# Patient Record
Sex: MALE | Race: WHITE | NOT HISPANIC OR LATINO | URBAN - METROPOLITAN AREA
[De-identification: names, ages, dates, MRNs, and addresses within clinical notes are randomized per-mention and may not be internally consistent; named-entity substitution may affect disease eponyms.]

---

## 2018-04-24 ENCOUNTER — EMERGENCY (EMERGENCY)
Facility: HOSPITAL | Age: 38
LOS: 0 days | Discharge: HOME | End: 2018-04-24
Attending: EMERGENCY MEDICINE | Admitting: EMERGENCY MEDICINE

## 2018-04-24 VITALS
DIASTOLIC BLOOD PRESSURE: 70 MMHG | SYSTOLIC BLOOD PRESSURE: 127 MMHG | RESPIRATION RATE: 18 BRPM | TEMPERATURE: 97 F | OXYGEN SATURATION: 96 % | HEART RATE: 75 BPM

## 2018-04-24 DIAGNOSIS — R31.9 HEMATURIA, UNSPECIFIED: ICD-10-CM

## 2018-04-24 DIAGNOSIS — N39.0 URINARY TRACT INFECTION, SITE NOT SPECIFIED: ICD-10-CM

## 2018-04-24 DIAGNOSIS — R10.30 LOWER ABDOMINAL PAIN, UNSPECIFIED: ICD-10-CM

## 2018-04-24 LAB
ALBUMIN SERPL ELPH-MCNC: 4.9 G/DL — SIGNIFICANT CHANGE UP (ref 3.5–5.2)
ALP SERPL-CCNC: 49 U/L — SIGNIFICANT CHANGE UP (ref 30–115)
ALT FLD-CCNC: 46 U/L — HIGH (ref 0–41)
ANION GAP SERPL CALC-SCNC: 15 MMOL/L — HIGH (ref 7–14)
APPEARANCE UR: (no result)
AST SERPL-CCNC: 23 U/L — SIGNIFICANT CHANGE UP (ref 0–41)
BASOPHILS # BLD AUTO: 0.06 K/UL — SIGNIFICANT CHANGE UP (ref 0–0.2)
BASOPHILS NFR BLD AUTO: 1.1 % — HIGH (ref 0–1)
BILIRUB SERPL-MCNC: 0.6 MG/DL — SIGNIFICANT CHANGE UP (ref 0.2–1.2)
BILIRUB UR-MCNC: NEGATIVE — SIGNIFICANT CHANGE UP
BUN SERPL-MCNC: 14 MG/DL — SIGNIFICANT CHANGE UP (ref 10–20)
CALCIUM SERPL-MCNC: 9.2 MG/DL — SIGNIFICANT CHANGE UP (ref 8.5–10.1)
CHLORIDE SERPL-SCNC: 100 MMOL/L — SIGNIFICANT CHANGE UP (ref 98–110)
CO2 SERPL-SCNC: 25 MMOL/L — SIGNIFICANT CHANGE UP (ref 17–32)
COLOR SPEC: (no result)
CREAT SERPL-MCNC: 0.8 MG/DL — SIGNIFICANT CHANGE UP (ref 0.7–1.5)
DIFF PNL FLD: (no result)
EOSINOPHIL # BLD AUTO: 0.21 K/UL — SIGNIFICANT CHANGE UP (ref 0–0.7)
EOSINOPHIL NFR BLD AUTO: 3.8 % — SIGNIFICANT CHANGE UP (ref 0–8)
GLUCOSE SERPL-MCNC: 102 MG/DL — HIGH (ref 70–99)
GLUCOSE UR QL: NEGATIVE MG/DL — SIGNIFICANT CHANGE UP
HCT VFR BLD CALC: 43.7 % — SIGNIFICANT CHANGE UP (ref 42–52)
HGB BLD-MCNC: 15.2 G/DL — SIGNIFICANT CHANGE UP (ref 14–18)
IMM GRANULOCYTES NFR BLD AUTO: 0.2 % — SIGNIFICANT CHANGE UP (ref 0.1–0.3)
KETONES UR-MCNC: NEGATIVE — SIGNIFICANT CHANGE UP
LACTATE SERPL-SCNC: 1.7 MMOL/L — SIGNIFICANT CHANGE UP (ref 0.5–2.2)
LEUKOCYTE ESTERASE UR-ACNC: (no result)
LIDOCAIN IGE QN: 29 U/L — SIGNIFICANT CHANGE UP (ref 7–60)
LYMPHOCYTES # BLD AUTO: 1.67 K/UL — SIGNIFICANT CHANGE UP (ref 1.2–3.4)
LYMPHOCYTES # BLD AUTO: 30.3 % — SIGNIFICANT CHANGE UP (ref 20.5–51.1)
MCHC RBC-ENTMCNC: 30 PG — SIGNIFICANT CHANGE UP (ref 27–31)
MCHC RBC-ENTMCNC: 34.8 G/DL — SIGNIFICANT CHANGE UP (ref 32–37)
MCV RBC AUTO: 86.4 FL — SIGNIFICANT CHANGE UP (ref 80–94)
MONOCYTES # BLD AUTO: 0.67 K/UL — HIGH (ref 0.1–0.6)
MONOCYTES NFR BLD AUTO: 12.1 % — HIGH (ref 1.7–9.3)
NEUTROPHILS # BLD AUTO: 2.9 K/UL — SIGNIFICANT CHANGE UP (ref 1.4–6.5)
NEUTROPHILS NFR BLD AUTO: 52.5 % — SIGNIFICANT CHANGE UP (ref 42.2–75.2)
NITRITE UR-MCNC: NEGATIVE — SIGNIFICANT CHANGE UP
PH UR: 6.5 — SIGNIFICANT CHANGE UP (ref 5–8)
PLATELET # BLD AUTO: 263 K/UL — SIGNIFICANT CHANGE UP (ref 130–400)
POTASSIUM SERPL-MCNC: 4.2 MMOL/L — SIGNIFICANT CHANGE UP (ref 3.5–5)
POTASSIUM SERPL-SCNC: 4.2 MMOL/L — SIGNIFICANT CHANGE UP (ref 3.5–5)
PROT SERPL-MCNC: 7.4 G/DL — SIGNIFICANT CHANGE UP (ref 6–8)
PROT UR-MCNC: >=300 MG/DL
RBC # BLD: 5.06 M/UL — SIGNIFICANT CHANGE UP (ref 4.7–6.1)
RBC # FLD: 12.2 % — SIGNIFICANT CHANGE UP (ref 11.5–14.5)
SODIUM SERPL-SCNC: 140 MMOL/L — SIGNIFICANT CHANGE UP (ref 135–146)
SP GR SPEC: 1.02 — SIGNIFICANT CHANGE UP (ref 1.01–1.03)
UROBILINOGEN FLD QL: 0.2 MG/DL — SIGNIFICANT CHANGE UP (ref 0.2–0.2)
WBC # BLD: 5.52 K/UL — SIGNIFICANT CHANGE UP (ref 4.8–10.8)
WBC # FLD AUTO: 5.52 K/UL — SIGNIFICANT CHANGE UP (ref 4.8–10.8)

## 2018-04-24 RX ORDER — CEFPODOXIME PROXETIL 100 MG
1 TABLET ORAL
Qty: 10 | Refills: 0
Start: 2018-04-24 | End: 2018-04-28

## 2018-04-24 NOTE — ED PROVIDER NOTE - OBJECTIVE STATEMENT
39 yo m p/w painless hematuria since last night still occuring, recalls right flank pain sharp intermittent for 2 seconds last night as well. no n/v/d/f/c.

## 2018-04-24 NOTE — ED PROVIDER NOTE - PROGRESS NOTE DETAILS
Attending note:  I personally evaluated the patient. I reviewed the Resident’s note (as assigned above), and agree with the findings and plan except as documented in my note.   37 y/o M no significant PMH, presents with hematuria that started last night around 9 pm and persisted into this morning. Pt describes multiple episodes of bright red blood in his urine. States that at first this was not associated with pain however during the night he developed R flank pain that later began radiating to the suprapubic region. Denies fever, chills, nausea, vomiting.   Physical exam: Pt non-toxic, well appearing. Pink conjunctiva, anicteric. MMM, no exudates. Neck supple, no crepitus. RRR, no murmur. Lungs CTAB. Abdomen soft, ND, + suprapubic tenderness, no rebound or guarding, no McBurney’s, no peritoneal signs, no CVAT, no hepatosplenomegaly.  uncircumcised male, no testicular tenderness, no swelling, no hernia. No rashes. No calf tenderness or edema. No focal neuro deficits.   Plan- Labs, imaging, reassess.

## 2018-04-24 NOTE — ED PROVIDER NOTE - NS ED ROS FT
Pt denied fvr/chills, HA, visual changes, dizziness, light headedness, presyncope, LOC, CP, MARCELINO, PND, SOB, cough, hemoptysis, abd pain, n/v/d, changes in bowel or bladder habits, LE edema, numbness, weakness, changes in gait.  The pt also denied recent travel outside of the country, recent illnesses, sick contacts, recent airplane trips or periods of immobility/bedbound. +hematuria

## 2018-08-16 ENCOUNTER — EMERGENCY (EMERGENCY)
Facility: HOSPITAL | Age: 38
LOS: 0 days | Discharge: HOME | End: 2018-08-16
Admitting: PHYSICIAN ASSISTANT

## 2018-08-16 VITALS
DIASTOLIC BLOOD PRESSURE: 92 MMHG | SYSTOLIC BLOOD PRESSURE: 132 MMHG | HEART RATE: 65 BPM | RESPIRATION RATE: 18 BRPM | TEMPERATURE: 98 F | OXYGEN SATURATION: 100 %

## 2018-08-16 DIAGNOSIS — Z79.899 OTHER LONG TERM (CURRENT) DRUG THERAPY: ICD-10-CM
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
## 2018-08-16 DIAGNOSIS — S05.02XA INJURY OF CONJUNCTIVA AND CORNEAL ABRASION WITHOUT FOREIGN BODY, LEFT EYE, INITIAL ENCOUNTER: ICD-10-CM
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
## 2018-08-16 DIAGNOSIS — H57.12 OCULAR PAIN, LEFT EYE: ICD-10-CM

## 2018-08-16 RX ORDER — POLYMYXIN B SULF/TRIMETHOPRIM 10000-1/ML
1 DROPS OPHTHALMIC (EYE)
Qty: 1 | Refills: 0
Start: 2018-08-16 | End: 2018-08-22

## 2018-08-16 NOTE — ED PROVIDER NOTE - PHYSICAL EXAMINATION
Physical Exam    Vital Signs: I have reviewed the initial vital signs.  Constitutional: well-nourished, appears stated age, no acute distress  Eyes: PERRL, EOMI. VA 20/20 OD, 20/20 OS (with correction). Left eye: +injected, tearing. Examined after Tetracaine 1 drop. +Floursein stain +corneal abrasion  Neuro: AOx3, Motor 5/5, Sensation: equal and intact

## 2018-08-16 NOTE — ED PROVIDER NOTE - OBJECTIVE STATEMENT
39 yo male  c/o left eye pain. Patient states his 10 month old baby poked him in left eye last night and he has had pain since. +redness, tearing, and light bothering eyes. No blurry vision. No contact use. Tetanus UTD.

## 2018-08-16 NOTE — ED PROVIDER NOTE - NS ED ROS FT
Constitutional: (-) fever  Eyes: (+) left eye pain, redness, tearing  Neurological: (-) altered mental status

## 2019-12-07 ENCOUNTER — TRANSCRIPTION ENCOUNTER (OUTPATIENT)
Age: 39
End: 2019-12-07

## 2020-06-30 ENCOUNTER — INPATIENT (INPATIENT)
Facility: HOSPITAL | Age: 40
LOS: 0 days | Discharge: HOME | End: 2020-07-01
Attending: UROLOGY | Admitting: UROLOGY
Payer: COMMERCIAL

## 2020-06-30 VITALS
DIASTOLIC BLOOD PRESSURE: 91 MMHG | TEMPERATURE: 98 F | HEART RATE: 75 BPM | HEIGHT: 71 IN | SYSTOLIC BLOOD PRESSURE: 139 MMHG | WEIGHT: 220.46 LBS | OXYGEN SATURATION: 98 % | RESPIRATION RATE: 18 BRPM

## 2020-06-30 DIAGNOSIS — Z98.890 OTHER SPECIFIED POSTPROCEDURAL STATES: Chronic | ICD-10-CM

## 2020-06-30 LAB
ALBUMIN SERPL ELPH-MCNC: 4.9 G/DL — SIGNIFICANT CHANGE UP (ref 3.5–5.2)
ALP SERPL-CCNC: 52 U/L — SIGNIFICANT CHANGE UP (ref 30–115)
ALT FLD-CCNC: 39 U/L — SIGNIFICANT CHANGE UP (ref 0–41)
ANION GAP SERPL CALC-SCNC: 13 MMOL/L — SIGNIFICANT CHANGE UP (ref 7–14)
APPEARANCE UR: ABNORMAL
APTT BLD: 39.6 SEC — HIGH (ref 27–39.2)
AST SERPL-CCNC: 23 U/L — SIGNIFICANT CHANGE UP (ref 0–41)
BACTERIA # UR AUTO: NEGATIVE — SIGNIFICANT CHANGE UP
BASOPHILS # BLD AUTO: 0.07 K/UL — SIGNIFICANT CHANGE UP (ref 0–0.2)
BASOPHILS NFR BLD AUTO: 1.2 % — HIGH (ref 0–1)
BILIRUB SERPL-MCNC: 0.3 MG/DL — SIGNIFICANT CHANGE UP (ref 0.2–1.2)
BILIRUB UR-MCNC: NEGATIVE — SIGNIFICANT CHANGE UP
BLD GP AB SCN SERPL QL: SIGNIFICANT CHANGE UP
BUN SERPL-MCNC: 18 MG/DL — SIGNIFICANT CHANGE UP (ref 10–20)
CALCIUM SERPL-MCNC: 9.2 MG/DL — SIGNIFICANT CHANGE UP (ref 8.5–10.1)
CHLORIDE SERPL-SCNC: 103 MMOL/L — SIGNIFICANT CHANGE UP (ref 98–110)
CO2 SERPL-SCNC: 23 MMOL/L — SIGNIFICANT CHANGE UP (ref 17–32)
COLOR SPEC: SIGNIFICANT CHANGE UP
CREAT SERPL-MCNC: 1 MG/DL — SIGNIFICANT CHANGE UP (ref 0.7–1.5)
DIFF PNL FLD: ABNORMAL
EOSINOPHIL # BLD AUTO: 0.16 K/UL — SIGNIFICANT CHANGE UP (ref 0–0.7)
EOSINOPHIL NFR BLD AUTO: 2.8 % — SIGNIFICANT CHANGE UP (ref 0–8)
EPI CELLS # UR: 0 /HPF — SIGNIFICANT CHANGE UP (ref 0–5)
GLUCOSE SERPL-MCNC: 111 MG/DL — HIGH (ref 70–99)
GLUCOSE UR QL: NEGATIVE — SIGNIFICANT CHANGE UP
HCT VFR BLD CALC: 44.5 % — SIGNIFICANT CHANGE UP (ref 42–52)
HGB BLD-MCNC: 15.7 G/DL — SIGNIFICANT CHANGE UP (ref 14–18)
HYALINE CASTS # UR AUTO: 1 /LPF — SIGNIFICANT CHANGE UP (ref 0–7)
IMM GRANULOCYTES NFR BLD AUTO: 0.2 % — SIGNIFICANT CHANGE UP (ref 0.1–0.3)
INR BLD: 0.97 RATIO — SIGNIFICANT CHANGE UP (ref 0.65–1.3)
KETONES UR-MCNC: NEGATIVE — SIGNIFICANT CHANGE UP
LEUKOCYTE ESTERASE UR-ACNC: NEGATIVE — SIGNIFICANT CHANGE UP
LIDOCAIN IGE QN: 26 U/L — SIGNIFICANT CHANGE UP (ref 7–60)
LYMPHOCYTES # BLD AUTO: 1.49 K/UL — SIGNIFICANT CHANGE UP (ref 1.2–3.4)
LYMPHOCYTES # BLD AUTO: 26.4 % — SIGNIFICANT CHANGE UP (ref 20.5–51.1)
MCHC RBC-ENTMCNC: 32 PG — HIGH (ref 27–31)
MCHC RBC-ENTMCNC: 35.3 G/DL — SIGNIFICANT CHANGE UP (ref 32–37)
MCV RBC AUTO: 90.6 FL — SIGNIFICANT CHANGE UP (ref 80–94)
MONOCYTES # BLD AUTO: 0.69 K/UL — HIGH (ref 0.1–0.6)
MONOCYTES NFR BLD AUTO: 12.2 % — HIGH (ref 1.7–9.3)
NEUTROPHILS # BLD AUTO: 3.22 K/UL — SIGNIFICANT CHANGE UP (ref 1.4–6.5)
NEUTROPHILS NFR BLD AUTO: 57.2 % — SIGNIFICANT CHANGE UP (ref 42.2–75.2)
NITRITE UR-MCNC: NEGATIVE — SIGNIFICANT CHANGE UP
NRBC # BLD: 0 /100 WBCS — SIGNIFICANT CHANGE UP (ref 0–0)
PH UR: 5.5 — SIGNIFICANT CHANGE UP (ref 5–8)
PLATELET # BLD AUTO: 273 K/UL — SIGNIFICANT CHANGE UP (ref 130–400)
POTASSIUM SERPL-MCNC: 4.3 MMOL/L — SIGNIFICANT CHANGE UP (ref 3.5–5)
POTASSIUM SERPL-SCNC: 4.3 MMOL/L — SIGNIFICANT CHANGE UP (ref 3.5–5)
PROT SERPL-MCNC: 7.7 G/DL — SIGNIFICANT CHANGE UP (ref 6–8)
PROT UR-MCNC: SIGNIFICANT CHANGE UP
PROTHROM AB SERPL-ACNC: 11.1 SEC — SIGNIFICANT CHANGE UP (ref 9.95–12.87)
RBC # BLD: 4.91 M/UL — SIGNIFICANT CHANGE UP (ref 4.7–6.1)
RBC # FLD: 12.3 % — SIGNIFICANT CHANGE UP (ref 11.5–14.5)
RBC CASTS # UR COMP ASSIST: 40 /HPF — HIGH (ref 0–4)
SODIUM SERPL-SCNC: 139 MMOL/L — SIGNIFICANT CHANGE UP (ref 135–146)
SP GR SPEC: 1.02 — SIGNIFICANT CHANGE UP (ref 1.01–1.02)
UROBILINOGEN FLD QL: SIGNIFICANT CHANGE UP
WBC # BLD: 5.64 K/UL — SIGNIFICANT CHANGE UP (ref 4.8–10.8)
WBC # FLD AUTO: 5.64 K/UL — SIGNIFICANT CHANGE UP (ref 4.8–10.8)
WBC UR QL: 1 /HPF — SIGNIFICANT CHANGE UP (ref 0–5)

## 2020-06-30 PROCEDURE — 93010 ELECTROCARDIOGRAM REPORT: CPT

## 2020-06-30 PROCEDURE — 99285 EMERGENCY DEPT VISIT HI MDM: CPT

## 2020-06-30 PROCEDURE — 74177 CT ABD & PELVIS W/CONTRAST: CPT | Mod: 26

## 2020-06-30 PROCEDURE — 99222 1ST HOSP IP/OBS MODERATE 55: CPT

## 2020-06-30 RX ORDER — ACETAMINOPHEN 500 MG
650 TABLET ORAL EVERY 6 HOURS
Refills: 0 | Status: DISCONTINUED | OUTPATIENT
Start: 2020-06-30 | End: 2020-07-01

## 2020-06-30 RX ORDER — SODIUM CHLORIDE 9 MG/ML
1000 INJECTION, SOLUTION INTRAVENOUS ONCE
Refills: 0 | Status: COMPLETED | OUTPATIENT
Start: 2020-06-30 | End: 2020-06-30

## 2020-06-30 RX ORDER — KETOROLAC TROMETHAMINE 30 MG/ML
15 SYRINGE (ML) INJECTION EVERY 6 HOURS
Refills: 0 | Status: DISCONTINUED | OUTPATIENT
Start: 2020-06-30 | End: 2020-07-01

## 2020-06-30 RX ORDER — KETOROLAC TROMETHAMINE 30 MG/ML
15 SYRINGE (ML) INJECTION ONCE
Refills: 0 | Status: DISCONTINUED | OUTPATIENT
Start: 2020-06-30 | End: 2020-06-30

## 2020-06-30 RX ORDER — SODIUM CHLORIDE 9 MG/ML
1000 INJECTION INTRAMUSCULAR; INTRAVENOUS; SUBCUTANEOUS
Refills: 0 | Status: DISCONTINUED | OUTPATIENT
Start: 2020-06-30 | End: 2020-07-01

## 2020-06-30 RX ADMIN — SODIUM CHLORIDE 125 MILLILITER(S): 9 INJECTION INTRAMUSCULAR; INTRAVENOUS; SUBCUTANEOUS at 21:52

## 2020-06-30 RX ADMIN — Medication 15 MILLIGRAM(S): at 15:31

## 2020-06-30 RX ADMIN — SODIUM CHLORIDE 1000 MILLILITER(S): 9 INJECTION, SOLUTION INTRAVENOUS at 10:54

## 2020-06-30 RX ADMIN — Medication 15 MILLIGRAM(S): at 10:53

## 2020-06-30 RX ADMIN — SODIUM CHLORIDE 125 MILLILITER(S): 9 INJECTION INTRAMUSCULAR; INTRAVENOUS; SUBCUTANEOUS at 15:31

## 2020-06-30 RX ADMIN — Medication 650 MILLIGRAM(S): at 21:50

## 2020-06-30 NOTE — H&P ADULT - NSHPPHYSICALEXAM_GEN_ALL_CORE
Vital Signs Last 24 Hrs  T(C): 36.7 (30 Jun 2020 10:05), Max: 36.7 (30 Jun 2020 10:05)  T(F): 98.1 (30 Jun 2020 10:05), Max: 98.1 (30 Jun 2020 10:05)  HR: 75 (30 Jun 2020 10:05) (75 - 75)  BP: 139/91 (30 Jun 2020 10:05) (139/91 - 139/91)  RR: 18 (30 Jun 2020 10:05) (18 - 18)  SpO2: 98% (30 Jun 2020 10:05) (98% - 98%)    PHYSICAL EXAM:  Constitutional: NAD, well-developed  HEENT: EOMI  Neck: no pain  Respiratory: No accessory respiratory muscle use  Abd: Soft, nontender, nondistended  : +L CVAT, mild suprapubic tenderness  Extremities: no edema  Neurological: A/O x 3  Psychiatric: Normal mood, normal affect  Skin: No rashes Vital Signs Last 24 Hrs  T(C): 36.7 (30 Jun 2020 10:05), Max: 36.7 (30 Jun 2020 10:05)  T(F): 98.1 (30 Jun 2020 10:05), Max: 98.1 (30 Jun 2020 10:05)  HR: 75 (30 Jun 2020 10:05) (75 - 75)  BP: 139/91 (30 Jun 2020 10:05) (139/91 - 139/91)  RR: 18 (30 Jun 2020 10:05) (18 - 18)  SpO2: 98% (30 Jun 2020 10:05) (98% - 98%)    PHYSICAL EXAM:  Constitutional: NAD, well-developed  HEENT: EOMI  Neck: no pain  Respiratory: No accessory respiratory muscle use  Abd: Soft, nontender, nondistended  : 1+L CVAT, mild suprapubic tenderness  Extremities: no edema  Neurological: A/O x 3  Psychiatric: Normal mood, normal affect  Skin: No rashes

## 2020-06-30 NOTE — ED PROVIDER NOTE - PROGRESS NOTE DETAILS
Attending Note: I personally evaluated the patient. I reviewed the Resident’s note (as assigned above), and agree with the findings and plan except as documented in my note.   39 y/o M with PMH of kidney stones, presents to ED c/o L flank pain associated with nausea since last night. Pt states he woke up from the pain last night and describes the pain to be intermittent radiating to the abdomen. No CP, SOB or testicular pain. No blood in urine, urinary frequency, urgency or incontinence.   PE: Pt in NAD, AAOX3. Head NCAT. CN II-XII intact. Lungs CTABL. CV S1S2 regular. Abdomen soft NTND, (+) BS. Extremities (-) edema. Motor 5/5 x4, sensation intact.  Plan: Labs, UA, CT, reevaluate. Attending Note: I personally evaluated the patient. I reviewed the Resident’s note (as assigned above), and agree with the findings and plan except as documented in my note.   39 y/o M with PMH of kidney stones, presents to ED c/o L flank pain associated with nausea since last night. Pt states he woke up from the pain last night and describes the pain to be intermittent radiating to the abdomen. No CP, SOB or testicular pain. No blood in urine, urinary frequency, urgency or incontinence.   PE: Pt in NAD, AAOX3. Head NCAT. CN II-XII intact. Lungs CTABL. CV S1S2 regular. Abdomen soft NTND, (+) BS. Back (-) CVA tenderness. Extremities (-) edema. Motor 5/5 x4, sensation intact.  Plan: Labs, UA, CT, reevaluate. CT reviewed, urology consulted. Will see pt.

## 2020-06-30 NOTE — ED PROVIDER NOTE - OBJECTIVE STATEMENT
The patient is a 40 year old male with a history of kidney stone presenting for left flank pain that began at 2AM last night. Constant sharp, pain that increases in intensity, radiating to left abdomen. Associated with nausea and urinary hesitancy. No fevers, vomiting, hematuria, testicular pain. Was told he had a large kidney stone a few months ago and saw a urologist but did not follow up with the procedure due to covid.

## 2020-06-30 NOTE — H&P ADULT - NSHPLABSRESULTS_GEN_ALL_CORE
LABS:                     15.7   5.64  )-----------( 273      ( 2020 10:41 )             44.5     06-30    139  |  103  |  18  ----------------------------<  111<H>  4.3   |  23  |  1.0    Ca    9.2      2020 10:41  TPro  7.7  /  Alb  4.9  /  TBili  0.3  /  DBili  x   /  AST  23  /  ALT  39  /  AlkPhos  52  06-30      Urinalysis Basic - ( 2020 10:41 )  Color: Light Yellow / Appearance: Slightly Turbid / S.020 / pH: x  Gluc: x / Ketone: Negative  / Bili: Negative / Urobili: <2 mg/dL   Blood: x / Protein: Trace / Nitrite: Negative   Leuk Esterase: Negative / RBC: 40 /HPF / WBC 1 /HPF   Sq Epi: x / Non Sq Epi: 0 /HPF / Bacteria: Negative LABS:                     15.7   5.64  )-----------( 273      ( 2020 10:41 )             44.5         139  |  103  |  18  ----------------------------<  111<H>  4.3   |  23  |  1.0    Ca    9.2      2020 10:41  TPro  7.7  /  Alb  4.9  /  TBili  0.3  /  DBili  x   /  AST  23  /  ALT  39  /  AlkPhos  52        Urinalysis Basic - ( 2020 10:41 )  Color: Light Yellow / Appearance: Slightly Turbid / S.020 / pH: x  Gluc: x / Ketone: Negative  / Bili: Negative / Urobili: <2 mg/dL   Blood: x / Protein: Trace / Nitrite: Negative   Leuk Esterase: Negative / RBC: 40 /HPF / WBC 1 /HPF   Sq Epi: x / Non Sq Epi: 0 /HPF / Bacteria: Negative    EXAM:  CT ABDOMEN AND PELVIS IC          PROCEDURE DATE:  2020    INTERPRETATION:  CLINICAL STATEMENT: Left-sided flank pain, history of kidney stones.    TECHNIQUE: Contiguous axial CT images were obtained from the lower chest tothe pubic symphysis following administration of 100cc Optiray 320 intravenous contrast.  Oral contrast was not administered.  Reformatted images in the coronal and sagittal planes were acquired.  COMPARISON CT: 2018.  OTHER STUDIES USED FOR CORRELATION: None.     FINDINGS:  LOWER CHEST: Unremarkable.  HEPATOBILIARY: Hepatic steatosis.  SPLEEN: Unremarkable.  PANCREAS: Unremarkable.  ADRENAL GLANDS: Unremarkable.  KIDNEYS: Mildly delayed left nephrogram. 2.0 x 0.9 x 1.5 cm left ureteropelvic junction calculus causing mild hydronephrosis (400 Hounsfield units). Multiple left-sided nonobstructing renal calculi measuring up to 3 mm. The right kidney is unremarkable.  ABDOMINOPELVIC NODES: Unremarkable.  PELVIC ORGANS: Unremarkable.  PERITONEUM/MESENTERY/BOWEL: Unremarkable. Normal appendix.  BONES/SOFT TISSUES: Unremarkable.    IMPRESSION:   2.0 x 0.9 x 1.5 cm left ureteropelvic junction calculus causing mild hydronephrosis (400 Hounsfield units).   Multiple left-sided nonobstructing renal calculi measuring up to 3 mm.     NAYE CONRAD M.D., ATTENDING RADIOLOGIST  This document has been electronically signed. 2020  1:49PM LABS:                     15.7   5.64  )-----------( 273      ( 2020 10:41 )             44.5         139  |  103  |  18  ----------------------------<  111<H>  4.3   |  23  |  1.0    Ca    9.2      2020 10:41  TPro  7.7  /  Alb  4.9  /  TBili  0.3  /  DBili  x   /  AST  23  /  ALT  39  /  AlkPhos  52        Urinalysis Basic - ( 2020 10:41 )  Color: Light Yellow / Appearance: Slightly Turbid / S.020 / pH: x  Gluc: x / Ketone: Negative  / Bili: Negative / Urobili: <2 mg/dL   Blood: x / Protein: Trace / Nitrite: Negative   Leuk Esterase: Negative / RBC: 40 /HPF / WBC 1 /HPF   Sq Epi: x / Non Sq Epi: 0 /HPF / Bacteria: Negative    EXAM:  CT ABDOMEN AND PELVIS IC          PROCEDURE DATE:  2020    INTERPRETATION:  CLINICAL STATEMENT: Left-sided flank pain, history of kidney stones.    TECHNIQUE: Contiguous axial CT images were obtained from the lower chest tothe pubic symphysis following administration of 100cc Optiray 320 intravenous contrast.  Oral contrast was not administered.  Reformatted images in the coronal and sagittal planes were acquired.  COMPARISON CT: 2018.  OTHER STUDIES USED FOR CORRELATION: None.     FINDINGS:  LOWER CHEST: Unremarkable.  HEPATOBILIARY: Hepatic steatosis.  SPLEEN: Unremarkable.  PANCREAS: Unremarkable.  ADRENAL GLANDS: Unremarkable.  KIDNEYS: Mildly delayed left nephrogram. 2.0 x 0.9 x 1.5 cm left ureteropelvic junction calculus causing mild hydronephrosis (400 Hounsfield units). Multiple left-sided nonobstructing renal calculi measuring up to 3 mm. The right kidney is unremarkable.  ABDOMINOPELVIC NODES: Unremarkable.  PELVIC ORGANS: Unremarkable.  PERITONEUM/MESENTERY/BOWEL: Unremarkable. Normal appendix.  BONES/SOFT TISSUES: Unremarkable.    IMPRESSION:   2.0 x 0.9 x 1.5 cm left ureteropelvic junction calculus causing mild hydronephrosis (400 Hounsfield units).     Multiple left-sided nonobstructing renal calculi measuring up to 3 mm.     NAYE CONRAD M.D., ATTENDING RADIOLOGIST  This document has been electronically signed. 2020  1:49PM

## 2020-06-30 NOTE — H&P ADULT - HISTORY OF PRESENT ILLNESS
Pt is a 39y/o M with pmh known 7mm kidney stone diagnosed last year presenting with 1 day h/o left sided flank pain, nausea, and urinary frequency. Pt states that he woke up at 2AM with severe 8/10 left sided flank pain radiating to abdomen. Denies fevers, chills, vomiting, dysuria at this time. Denies hematuria at home but while here in the ED he states he saw a pink tinge to his urine. States that he has been voiding more than usual, not voiding a large amount per void. CT A/P showing a 2.0 x 0.9 x 1.5cm Left UPJ stone causing mild hydronephrosis (400HU).

## 2020-06-30 NOTE — H&P ADULT - ASSESSMENT
41y/o M with mild Left hydronephrosis 2/2 a 2 x 0.9 x 1.5cm LEFT UPJ     - Case d/w Dr. Vann - possibly can do Cystoscopy, Left ureteroscopy, placement of LEFT nephroureteral stent   - Keep NPO for possible procedure today  - Consult IR for possible placement from above if unable to do procedure today   - F/u STAT COVID swab 41y/o M with mild Left hydronephrosis 2/2 a 2 x 0.9 x 1.5cm LEFT UPJ     - Case d/w Dr. Vann - possibly can do Cystoscopy, Left ureteroscopy, placement of LEFT nephroureteral stent   - Keep NPO for possible procedure today  - Case d/w IR resident, if cannot place stent from below today, IR may be able to place from above tomorrow AM.   - F/u STAT COVID swab 39y/o M with mild Left hydronephrosis 2/2 a 2 x 0.9 x 1.5cm LEFT UPJ stone     - Case d/w Dr. Vann - possibly can do Cystoscopy, Left ureteroscopy, placement of LEFT nephroureteral stent   - Keep NPO for possible procedure today  - Case d/w IR resident, if cannot place stent from below today, IR may be able to place from above tomorrow AM.   - F/u STAT COVID swab 39y/o M with mild Left hydronephrosis 2/2 a 2 x 0.9 x 1.5cm LEFT UPJ stone     - Case d/w Dr. Vann - possibly can do Cystoscopy, Left ureteroscopy, placement of LEFT nephroureteral stent   - Keep NPO for possible procedure today  - Case d/w IR resident, if cannot place stent from below today, IR may be able to place from above tomorrow AM.   - F/u STAT COVID swab    UPDATED PLAN:  - Pt seen and examined at bedside with Dr. Vann  - Pt to get access to LEFT kidney with IR tomorrow morning. Pt added to OR Add on sched for tomorrow evening 8PM for LEFT PCNL with Dr. Vann.  - Pt can eat regular diet but will be NPO after midnight. 39y/o M with mild Left hydronephrosis 2/2 a 2 x 0.9 x 1.5cm LEFT UPJ stone     - Case d/w Dr. Vann - possibly can do Cystoscopy, Left ureteroscopy, placement of LEFT nephroureteral stent but one and done probably in rehana 80-85% range  - Keep NPO for possible procedure today  - Case d/w IR resident, if cannot place stent from below today, IR may be able to place from above tomorrow AM.   - F/u STAT COVID swab    UPDATED PLAN:  - Pt seen and examined at bedside with Dr. Vann  - Pt to get access to LEFT kidney with IR tomorrow morning. Pt added to OR Add on sched for tomorrow evening 8PM for LEFT PCNL with Dr. Vann.  - Pt can eat regular diet but will be NPO after midnight.     Tomorrow review the stone is probably about 22 mm but to make it more complicated the outer half at least by diameter probably three quarters by volume appears to be uric acid with the central dense core. This would make shockwave lithotripsy much more difficult. We discussed the use of ureteroscopy which given the size may include a double J followed by a ureteral reactor sheath and laser lithotripsy which would be a much more prolonged procedure the possibly less acutely painful possibly more bothersome over time with the lower success rate for stone free. We discussed going to a percutaneous approach which in my experience would probably be 95% one and done with the increased risks of every to going through your kidney and probably limited physical exertion for about a month and it is discussing the risk benefits and alternatives elected for the PCNL. I made arrangements including talking to the interventional radiologist to get access in the morning putting him on your schedule (I tried to do him tonight but interventional radiology had already left and did not have a team for not urging cases. He is therefore put on the schedule for tomorrow there will see if they can get me a room early morning is so intervention will put in the access first thing and he would be my first case hopefully before office hours. If not they will put in the access in the afternoon and I will do him in the mid-evening after office hours.

## 2020-06-30 NOTE — H&P ADULT - NSHPREVIEWOFSYSTEMS_GEN_ALL_CORE
CONSTITUTIONAL:  No fevers or chills  HEENT: No visual changes  ENDO: No sweating  NECK: No pain or stiffness  MUSCULOSKELETAL: No back pain, no joint pain  RESPIRATORY: No shortness of breath  CARDIOVASCULAR: No chest pain  GASTROINTESTINAL: No abdominal or epigastric pain. No nausea, vomiting,  No diarrhea or constipation.   NEUROLOGICAL: No mental status changes  PSYCH: No depression, no mood changes  SKIN: No itching

## 2020-06-30 NOTE — H&P ADULT - ATTENDING COMMENTS
the patient was given toradol - not a candidate for a PCN  will undergo a left stent with possible ureteroscopy laser lithotripsy   all questions answered I personally saw and examined the patient, reviewed the chart and available data. I discussed the situation with the patient and [facility] as well as IR. I also reviewed and/or amended the note as necessary.      I spent close to an hour with attempted booking / arrangements with IR, showing pt the films and discussing options    patient seen on the day in question. Note not reviewed and cosigned until now due to scheduling issues    addendum 07/01/2020 pt had gotten toradol. case discussed with IR unless urgent prefer to wait until it wears off as decreased bleeding with higher success rate. Pt agreed to stent today by Dr. Burrows   f/u Thursday to arrange for Tuesday PCNL with IR access same day      the patient was given toradol - not a candidate for a PCN will undergo a left stent with possible ureteroscopy laser lithotripsy   all questions answered

## 2020-06-30 NOTE — ED PROVIDER NOTE - NS ED ROS FT
Eyes:  No visual changes, eye pain or discharge.  ENMT:  No hearing changes, pain, discharge or infections. No neck pain or stiffness.  Cardiac:  No chest pain, SOB or edema. No chest pain with exertion.  Respiratory:  No cough or respiratory distress. No hemoptysis. No history of asthma or RAD.  GI:  + nausea, no vomiting, diarrhea +mild abdominal pain.   :  No dysuria, frequency or burning. +urinary hestinancy. no hematuria.   MS:  +left flank/lower left back pain.   Neuro:  No headache or weakness.  No LOC.  Skin:  No skin rash.   Endocrine: No history of thyroid disease or diabetes.

## 2020-06-30 NOTE — ED PROVIDER NOTE - PHYSICAL EXAMINATION
CONSTITUTIONAL: Well-developed; well-nourished; appears in mild  distress from pain.   SKIN: warm, dry  HEAD: Normocephalic; atraumatic.  EYES:  EOMI, normal sclera and conjunctiva   ENT: No nasal discharge; airway clear.  NECK: Supple; non tender.  CARD: S1, S2 normal; no murmurs, gallops, or rubs.   RESP: No wheezes, rales or rhonchi.  ABD: mild ttp to left lower quadrant.   BACK: CVA ttp left side. no right CVA ttp.   EXT: Normal ROM.  No clubbing, cyanosis or edema.   LYMPH: No acute cervical adenopathy.  NEURO: Alert, oriented, grossly unremarkable  PSYCH: Cooperative, appropriate.

## 2020-07-01 ENCOUNTER — TRANSCRIPTION ENCOUNTER (OUTPATIENT)
Age: 40
End: 2020-07-01

## 2020-07-01 VITALS
DIASTOLIC BLOOD PRESSURE: 63 MMHG | TEMPERATURE: 97 F | HEART RATE: 80 BPM | SYSTOLIC BLOOD PRESSURE: 127 MMHG | RESPIRATION RATE: 18 BRPM

## 2020-07-01 LAB
ANION GAP SERPL CALC-SCNC: 9 MMOL/L — SIGNIFICANT CHANGE UP (ref 7–14)
BASOPHILS # BLD AUTO: 0.07 K/UL — SIGNIFICANT CHANGE UP (ref 0–0.2)
BASOPHILS NFR BLD AUTO: 1.1 % — HIGH (ref 0–1)
BUN SERPL-MCNC: 14 MG/DL — SIGNIFICANT CHANGE UP (ref 10–20)
CALCIUM SERPL-MCNC: 9.4 MG/DL — SIGNIFICANT CHANGE UP (ref 8.5–10.1)
CHLORIDE SERPL-SCNC: 106 MMOL/L — SIGNIFICANT CHANGE UP (ref 98–110)
CO2 SERPL-SCNC: 27 MMOL/L — SIGNIFICANT CHANGE UP (ref 17–32)
CREAT SERPL-MCNC: 0.9 MG/DL — SIGNIFICANT CHANGE UP (ref 0.7–1.5)
CULTURE RESULTS: NO GROWTH — SIGNIFICANT CHANGE UP
EOSINOPHIL # BLD AUTO: 0.23 K/UL — SIGNIFICANT CHANGE UP (ref 0–0.7)
EOSINOPHIL NFR BLD AUTO: 3.5 % — SIGNIFICANT CHANGE UP (ref 0–8)
GLUCOSE SERPL-MCNC: 103 MG/DL — HIGH (ref 70–99)
HCT VFR BLD CALC: 43.8 % — SIGNIFICANT CHANGE UP (ref 42–52)
HGB BLD-MCNC: 15.1 G/DL — SIGNIFICANT CHANGE UP (ref 14–18)
IMM GRANULOCYTES NFR BLD AUTO: 0.2 % — SIGNIFICANT CHANGE UP (ref 0.1–0.3)
LYMPHOCYTES # BLD AUTO: 1.47 K/UL — SIGNIFICANT CHANGE UP (ref 1.2–3.4)
LYMPHOCYTES # BLD AUTO: 22.2 % — SIGNIFICANT CHANGE UP (ref 20.5–51.1)
MCHC RBC-ENTMCNC: 31.7 PG — HIGH (ref 27–31)
MCHC RBC-ENTMCNC: 34.5 G/DL — SIGNIFICANT CHANGE UP (ref 32–37)
MCV RBC AUTO: 91.8 FL — SIGNIFICANT CHANGE UP (ref 80–94)
MONOCYTES # BLD AUTO: 0.79 K/UL — HIGH (ref 0.1–0.6)
MONOCYTES NFR BLD AUTO: 11.9 % — HIGH (ref 1.7–9.3)
NEUTROPHILS # BLD AUTO: 4.05 K/UL — SIGNIFICANT CHANGE UP (ref 1.4–6.5)
NEUTROPHILS NFR BLD AUTO: 61.1 % — SIGNIFICANT CHANGE UP (ref 42.2–75.2)
NRBC # BLD: 0 /100 WBCS — SIGNIFICANT CHANGE UP (ref 0–0)
PLATELET # BLD AUTO: 263 K/UL — SIGNIFICANT CHANGE UP (ref 130–400)
POTASSIUM SERPL-MCNC: 4.7 MMOL/L — SIGNIFICANT CHANGE UP (ref 3.5–5)
POTASSIUM SERPL-SCNC: 4.7 MMOL/L — SIGNIFICANT CHANGE UP (ref 3.5–5)
RBC # BLD: 4.77 M/UL — SIGNIFICANT CHANGE UP (ref 4.7–6.1)
RBC # FLD: 12.4 % — SIGNIFICANT CHANGE UP (ref 11.5–14.5)
SARS-COV-2 RNA SPEC QL NAA+PROBE: SIGNIFICANT CHANGE UP
SODIUM SERPL-SCNC: 142 MMOL/L — SIGNIFICANT CHANGE UP (ref 135–146)
SPECIMEN SOURCE: SIGNIFICANT CHANGE UP
WBC # BLD: 6.62 K/UL — SIGNIFICANT CHANGE UP (ref 4.8–10.8)
WBC # FLD AUTO: 6.62 K/UL — SIGNIFICANT CHANGE UP (ref 4.8–10.8)

## 2020-07-01 PROCEDURE — 99231 SBSQ HOSP IP/OBS SF/LOW 25: CPT

## 2020-07-01 RX ORDER — ONDANSETRON 8 MG/1
4 TABLET, FILM COATED ORAL ONCE
Refills: 0 | Status: DISCONTINUED | OUTPATIENT
Start: 2020-07-01 | End: 2020-07-01

## 2020-07-01 RX ORDER — SODIUM CHLORIDE 9 MG/ML
1000 INJECTION, SOLUTION INTRAVENOUS
Refills: 0 | Status: DISCONTINUED | OUTPATIENT
Start: 2020-07-01 | End: 2020-07-01

## 2020-07-01 RX ORDER — TRAMADOL HYDROCHLORIDE 50 MG/1
1 TABLET ORAL
Qty: 24 | Refills: 0
Start: 2020-07-01 | End: 2020-07-06

## 2020-07-01 RX ORDER — HYDROMORPHONE HYDROCHLORIDE 2 MG/ML
0.5 INJECTION INTRAMUSCULAR; INTRAVENOUS; SUBCUTANEOUS
Refills: 0 | Status: DISCONTINUED | OUTPATIENT
Start: 2020-07-01 | End: 2020-07-01

## 2020-07-01 RX ORDER — SODIUM CHLORIDE 9 MG/ML
1000 INJECTION INTRAMUSCULAR; INTRAVENOUS; SUBCUTANEOUS
Refills: 0 | Status: DISCONTINUED | OUTPATIENT
Start: 2020-07-01 | End: 2020-07-01

## 2020-07-01 RX ORDER — ACETAMINOPHEN 500 MG
650 TABLET ORAL EVERY 6 HOURS
Refills: 0 | Status: DISCONTINUED | OUTPATIENT
Start: 2020-07-01 | End: 2020-07-01

## 2020-07-01 RX ORDER — MORPHINE SULFATE 50 MG/1
2 CAPSULE, EXTENDED RELEASE ORAL
Refills: 0 | Status: DISCONTINUED | OUTPATIENT
Start: 2020-07-01 | End: 2020-07-01

## 2020-07-01 RX ADMIN — SODIUM CHLORIDE 125 MILLILITER(S): 9 INJECTION INTRAMUSCULAR; INTRAVENOUS; SUBCUTANEOUS at 04:50

## 2020-07-01 RX ADMIN — Medication 650 MILLIGRAM(S): at 15:08

## 2020-07-01 RX ADMIN — SODIUM CHLORIDE 75 MILLILITER(S): 9 INJECTION, SOLUTION INTRAVENOUS at 12:17

## 2020-07-01 NOTE — DISCHARGE NOTE NURSING/CASE MANAGEMENT/SOCIAL WORK - PATIENT PORTAL LINK FT
You can access the FollowMyHealth Patient Portal offered by [facility] by registering at the following website: [email or web address]. By joining [facility]’s FollowMyHealth portal, you will also be able to view your health information using other applications (apps) compatible with our system.

## 2020-07-01 NOTE — DISCHARGE NOTE PROVIDER - NSDCMRMEDTOKEN_GEN_ALL_CORE_FT
cefpodoxime 100 mg oral tablet: 1 tab(s) orally 2 times a day   Polytrim 10,000 units-1 mg/mL ophthalmic solution: 1 drop(s) in each affected eye 4 times a day traMADol 50 mg oral tablet: 1 tab(s) orally every 6 hours MDD:4

## 2020-07-01 NOTE — PROGRESS NOTE ADULT - SUBJECTIVE AND OBJECTIVE BOX
Progress Note    Subjective  39 y/o Male left nephrolithiasis. Pt admitted for pain control.  Pt doing better this am pain 3/10 well controlled with meds. Pt received Toradol   yesterday.    [x] a 10 point review of systems was negative except where noted    [  ]  Due to altered mental status/intubation, subjective information was not able t be obtained from the patient.  History was obtained, to the extent possible, from review of the chart and collateral sources of information.    Vital signs  T(C): , Max: 36.8 (06-30-20 @ 19:42)  HR: 70 (07-01-20 @ 07:37)  BP: 110/65 (07-01-20 @ 07:37)  SpO2: 98% (06-30-20 @ 23:04)    Gen NC/AT in NAD  SKIN warm, dry with good tugor  Neck supple, FROM  LUNGS No dyspnea, No accessory muscle use  BACK No CVAT  ABD    Soft non tender, bladder not palpable   voiding freely      Labs                        15.1   6.62  )-----------( 263      ( 01 Jul 2020 07:04 )             43.8     01 Jul 2020 07:04    142    |  106    |  14     ----------------------------<  103    4.7     |  27     |  0.9      Ca    9.4        01 Jul 2020 07:04

## 2020-07-01 NOTE — DISCHARGE NOTE PROVIDER - HOSPITAL COURSE
Pt is a 39y/o who presented yesterday with left flank pain and nausea. Pt was diagnosed with mild left hydronephrosis 2/2 a 2 x 0.9 x 1.5cm Left UPJ stone. Pt received 2 doses of toradol in the ED which controlled his pain. Initially plan was for IR access to Left Kidney for PCNL today, but due to the bleeding risk with toradol, the case was rescheduled and instead the patient had a Left JJ stent placed sucessfully. The pt remained afebrile with pain controlled and was sent home POD # 0.

## 2020-07-01 NOTE — DISCHARGE NOTE PROVIDER - NSDCFUSCHEDAPPT_GEN_ALL_CORE_FT
VALARIE ALVA ; 07/07/2020 ; [facility] [address] VALARIE ALVA ; 07/07/2020 ; [facility] [address]

## 2020-07-01 NOTE — PROGRESS NOTE ADULT - ASSESSMENT
39 y/o m with a 2 cm left renal stone    A) left nephrolithiasis    P) Pt was schedule for left percutaneous access for PCNL  today. Due to the fact that he received Toradol Dr. deutsch  will delay the treatment of the stone until next Tuesday.  He is offering the pt a JJ stent  to relieve his pain.  The pt will speak to his wife and make a decision.  d/w attending

## 2020-07-01 NOTE — CONSULT NOTE ADULT - SUBJECTIVE AND OBJECTIVE BOX
INTERVENTIONAL RADIOLOGY CONSULT:     Procedure Requested:     HPI:  Pt is a 39y/o M with pmh known 7mm kidney stone diagnosed last year presenting with 1 day h/o left sided flank pain, nausea, and urinary frequency. Pt states that he woke up at 2AM with severe 8/10 left sided flank pain radiating to abdomen. Denies fevers, chills, vomiting, dysuria at this time. Denies hematuria at home but while here in the ED he states he saw a pink tinge to his urine. States that he has been voiding more than usual, not voiding a large amount per void. CT A/P showing a 2.0 x 0.9 x 1.5cm Left UPJ stone causing mild hydronephrosis (400HU). (30 Jun 2020 15:02)      PAST MEDICAL & SURGICAL HISTORY:  No pertinent past medical history  H/O knee surgery      MEDICATIONS  (STANDING):  sodium chloride 0.9%. 1000 milliLiter(s) (125 mL/Hr) IV Continuous <Continuous>    MEDICATIONS  (PRN):  acetaminophen   Tablet .. 650 milliGRAM(s) Oral every 6 hours PRN Temp greater or equal to 38C (100.4F), Mild Pain (1 - 3)  ketorolac   Injectable 15 milliGRAM(s) IV Push every 6 hours PRN Moderate Pain (4 - 6)      Allergies    No Known Allergies    Intolerances      FAMILY HISTORY:  No pertinent family history in first degree relatives      Physical Exam:   Vital Signs Last 24 Hrs  T(C): 36.5 (01 Jul 2020 07:37), Max: 36.8 (30 Jun 2020 19:42)  T(F): 97.7 (01 Jul 2020 07:37), Max: 98.3 (30 Jun 2020 19:42)  HR: 70 (01 Jul 2020 07:37) (61 - 75)  BP: 110/65 (01 Jul 2020 07:37) (110/65 - 153/89)  BP(mean): 90 (30 Jun 2020 19:42) (90 - 90)  RR: 18 (01 Jul 2020 07:37) (17 - 19)  SpO2: 98% (30 Jun 2020 23:04) (97% - 98%)    General: NAD, alert oriented, sitting comfortably in bed  Lungs: breathing comfortably  Cardiovascular: extremities warm and well-perfused  Abdomen: soft, nontender    Labs:                         15.1   6.62  )-----------( 263      ( 01 Jul 2020 07:04 )             43.8     07-01    142  |  106  |  14  ----------------------------<  103<H>  4.7   |  27  |  0.9    Ca    9.4      01 Jul 2020 07:04    TPro  7.7  /  Alb  4.9  /  TBili  0.3  /  DBili  x   /  AST  23  /  ALT  39  /  AlkPhos  52  06-30    PT/INR - ( 30 Jun 2020 15:19 )   PT: 11.10 sec;   INR: 0.97 ratio         PTT - ( 30 Jun 2020 15:19 )  PTT:39.6 sec    Pertinent labs:                      15.1   6.62  )-----------( 263      ( 01 Jul 2020 07:04 )             43.8       07-01    142  |  106  |  14  ----------------------------<  103<H>  4.7   |  27  |  0.9    Ca    9.4      01 Jul 2020 07:04    TPro  7.7  /  Alb  4.9  /  TBili  0.3  /  DBili  x   /  AST  23  /  ALT  39  /  AlkPhos  52  06-30      PT/INR - ( 30 Jun 2020 15:19 )   PT: 11.10 sec;   INR: 0.97 ratio         PTT - ( 30 Jun 2020 15:19 )  PTT:39.6 sec    Radiology & Additional Studies:     Radiology imaging reviewed.       ASSESSMENT/ PLAN:   Otherwise healthy 40M with 1.5 cm obstructing L UPJ calculus. Clinically nontoxic, hemodynamically stable, normal renal function. Per discussion with urology, plan for PCNU with follow PNL. Procedure discussed at length with patient and he is amenable.   - L PCNU today.   - Continue NPO, hold anticoagulation.    Thank you for the courtesy of this consult, please call [phone] with any further questions. INTERVENTIONAL RADIOLOGY CONSULT:     Procedure Requested:     HPI:  Pt is a 39y/o M with pmh known 7mm kidney stone diagnosed last year presenting with 1 day h/o left sided flank pain, nausea, and urinary frequency. Pt states that he woke up at 2AM with severe 8/10 left sided flank pain radiating to abdomen. Denies fevers, chills, vomiting, dysuria at this time. Denies hematuria at home but while here in the ED he states he saw a pink tinge to his urine. States that he has been voiding more than usual, not voiding a large amount per void. CT A/P showing a 2.0 x 0.9 x 1.5cm Left UPJ stone causing mild hydronephrosis (400HU). (30 Jun 2020 15:02)      PAST MEDICAL & SURGICAL HISTORY:  No pertinent past medical history  H/O knee surgery      MEDICATIONS  (STANDING):  sodium chloride 0.9%. 1000 milliLiter(s) (125 mL/Hr) IV Continuous <Continuous>    MEDICATIONS  (PRN):  acetaminophen   Tablet .. 650 milliGRAM(s) Oral every 6 hours PRN Temp greater or equal to 38C (100.4F), Mild Pain (1 - 3)  ketorolac   Injectable 15 milliGRAM(s) IV Push every 6 hours PRN Moderate Pain (4 - 6)      Allergies    No Known Allergies    Intolerances      FAMILY HISTORY:  No pertinent family history in first degree relatives      Physical Exam:   Vital Signs Last 24 Hrs  T(C): 36.5 (01 Jul 2020 07:37), Max: 36.8 (30 Jun 2020 19:42)  T(F): 97.7 (01 Jul 2020 07:37), Max: 98.3 (30 Jun 2020 19:42)  HR: 70 (01 Jul 2020 07:37) (61 - 75)  BP: 110/65 (01 Jul 2020 07:37) (110/65 - 153/89)  BP(mean): 90 (30 Jun 2020 19:42) (90 - 90)  RR: 18 (01 Jul 2020 07:37) (17 - 19)  SpO2: 98% (30 Jun 2020 23:04) (97% - 98%)    General: NAD, alert oriented, sitting comfortably in bed  Lungs: breathing comfortably  Cardiovascular: extremities warm and well-perfused  Abdomen: soft, nontender    Labs:                         15.1   6.62  )-----------( 263      ( 01 Jul 2020 07:04 )             43.8     07-01    142  |  106  |  14  ----------------------------<  103<H>  4.7   |  27  |  0.9    Ca    9.4      01 Jul 2020 07:04    TPro  7.7  /  Alb  4.9  /  TBili  0.3  /  DBili  x   /  AST  23  /  ALT  39  /  AlkPhos  52  06-30    PT/INR - ( 30 Jun 2020 15:19 )   PT: 11.10 sec;   INR: 0.97 ratio         PTT - ( 30 Jun 2020 15:19 )  PTT:39.6 sec    Pertinent labs:                      15.1   6.62  )-----------( 263      ( 01 Jul 2020 07:04 )             43.8       07-01    142  |  106  |  14  ----------------------------<  103<H>  4.7   |  27  |  0.9    Ca    9.4      01 Jul 2020 07:04    TPro  7.7  /  Alb  4.9  /  TBili  0.3  /  DBili  x   /  AST  23  /  ALT  39  /  AlkPhos  52  06-30      PT/INR - ( 30 Jun 2020 15:19 )   PT: 11.10 sec;   INR: 0.97 ratio         PTT - ( 30 Jun 2020 15:19 )  PTT:39.6 sec    Radiology & Additional Studies:     Radiology imaging reviewed.       ASSESSMENT/ PLAN:   Otherwise healthy 40M with 1.5 cm obstructing L UPJ calculus. Clinically nontoxic, hemodynamically stable, normal renal function. Per discussion with urology, patient was given Toradol pre-procedure, which puts the patient at increased risk of bleed with nephrostomy and surgery.  Case to be re-scheduled for early next week.  D/W patient and Dr. Vann.        Thank you for the courtesy of this consult, please call [phone] with any further questions.

## 2020-07-01 NOTE — DISCHARGE NOTE PROVIDER - CARE PROVIDER_API CALL
Cami Vann  Urology  [address] [place] [address] [address] [place] [id number]  Phone: [phone]  Fax: [phone]  Follow Up Time:

## 2020-07-01 NOTE — CHART NOTE - NSCHARTNOTEFT_GEN_A_CORE
Discussed the plan for today in detail with patient. Pt's pain 2-3/10 at this time - given the option to go home to follow up with Dr. Vann as outpatient for PCNL with the risk of increased pain/infection, or to stay as inpatient to get Left JJ stent later today to treat his pain. He opts to stay as inpatient and get the Left JJ stent placed.     - Case discussed with Dr. Burrows, case booked as Cystoscopy, placement of LEFT JJ stent, possible ureteroscopy, possible laser lithotripsy.   - Pt to remain NPO.  - Cont pain control.   - If remains afebrile post-procedure, plan to d/c home today and follow up with Dr. Vann to plan outpatient procedure potentially for next Tuesday 7/7  - D/w IR, booked for next Tuesday morning.

## 2020-07-01 NOTE — PRE-ANESTHESIA EVALUATION ADULT - NSANTHOSAYNRD_GEN_A_CORE
No. SONYA screening performed.  STOP BANG Legend: 0-2 = LOW Risk; 3-4 = INTERMEDIATE Risk; 5-8 = HIGH Risk

## 2020-07-02 ENCOUNTER — APPOINTMENT (OUTPATIENT)
Dept: UROLOGY | Facility: CLINIC | Age: 40
End: 2020-07-02
Payer: COMMERCIAL

## 2020-07-02 VITALS
WEIGHT: 220 LBS | SYSTOLIC BLOOD PRESSURE: 130 MMHG | BODY MASS INDEX: 30.8 KG/M2 | DIASTOLIC BLOOD PRESSURE: 87 MMHG | TEMPERATURE: 98.4 F | HEART RATE: 105 BPM | HEIGHT: 71 IN

## 2020-07-02 DIAGNOSIS — Z78.9 OTHER SPECIFIED HEALTH STATUS: ICD-10-CM

## 2020-07-02 DIAGNOSIS — Z87.891 PERSONAL HISTORY OF NICOTINE DEPENDENCE: ICD-10-CM

## 2020-07-02 PROCEDURE — 99215 OFFICE O/P EST HI 40 MIN: CPT

## 2020-07-02 NOTE — LETTER HEADER
[FreeTextEntry3] : Cami Vann M.D.\par Director of Urology\par [facility]\par [address] [place] [address], [address]\[place] [address]

## 2020-07-02 NOTE — ASSESSMENT
[FreeTextEntry1] : His physical exam shows no signs of infection. He has the expected tenderness from the stent, I feel that H&P, we get consent to be coming in Tuesday for outpatient access with PCNL to go home come back the next day for a CT stentogram

## 2020-07-02 NOTE — PHYSICAL EXAM
[General Appearance - Well Developed] : well developed [Normal Appearance] : normal appearance [General Appearance - Well Nourished] : well nourished [Well Groomed] : well groomed [Abdomen Soft] : soft [Abdomen Tenderness] : non-tender [General Appearance - In No Acute Distress] : no acute distress [Urethral Meatus] : meatus normal [Penis Abnormality] : normal uncircumcised penis [Costovertebral Angle Tenderness] : no ~M costovertebral angle tenderness [Abdomen Hernia] : no hernia was discovered [Epididymis] : the epididymides were normal [Testes Tenderness] : no tenderness of the testes [Heart Rate And Rhythm] : Heart rate and rhythm were normal [Testes Mass (___cm)] : there were no testicular masses [Edema] : no peripheral edema [Respiration, Rhythm And Depth] : normal respiratory rhythm and effort [Exaggerated Use Of Accessory Muscles For Inspiration] : no accessory muscle use [] : no respiratory distress [Auscultation Breath Sounds / Voice Sounds] : lungs were clear to auscultation bilaterally [Oriented To Time, Place, And Person] : oriented to person, place, and time [Affect] : the affect was normal [Not Anxious] : not anxious [Mood] : the mood was normal [Normal Station and Gait] : the gait and station were normal for the patient's age [No Focal Deficits] : no focal deficits [Inguinal Lymph Nodes Enlarged Bilaterally] : inguinal [FreeTextEntry1] : dtr normal

## 2020-07-02 NOTE — HISTORY OF PRESENT ILLNESS
[Urinary Urgency] : urinary urgency [Hematuria - Gross] : gross hematuria [Urinary Frequency] : urinary frequency [FreeTextEntry1] : Joseph went to the emergency room on June 30 with left-sided colic with a history of the stone that was reportedly 7 mm being diagnosed by different urologist some time ago perhaps one or two years. It was asymptomatic until the day in question when he had severe renal colic. Valuation show that the stone was 22 mm with a 7 mm core and the rest was uric acid with the hiding with about 12 x 13 mm. Look like a piece of someone’s thumb. I had seen him discuss the options with him try to get him into the OR Wednesday night but interventional did not have someone to come in at night unless it was really urgent. We put him on for the next day but in the morning found out that while he was in the emergency room at constant Toradol as I cannot get them into the operating room till 7 o’clock at night with the increased risk of bleeding I felt it best to temporize. Dr. Burrows put up a double J and he is here today to discuss the procedure which I’m going to try to do this coming Tuesday. He thinks his maternal grandmother had stones he does not have contact with his paternal side three has no idea he said no prior stones. Right now though the colic pain is gone the stent pain is bothering him. When he urinates he gets back pain, when is not urinating he feels like he has to urinate and he has some mild hematuria. He also feels like he has to go much more often.

## 2020-07-04 ENCOUNTER — OUTPATIENT (OUTPATIENT)
Dept: OUTPATIENT SERVICES | Facility: HOSPITAL | Age: 40
LOS: 1 days | Discharge: HOME | End: 2020-07-04

## 2020-07-04 ENCOUNTER — LABORATORY RESULT (OUTPATIENT)
Age: 40
End: 2020-07-04

## 2020-07-04 DIAGNOSIS — Z98.890 OTHER SPECIFIED POSTPROCEDURAL STATES: Chronic | ICD-10-CM

## 2020-07-04 DIAGNOSIS — Z11.59 ENCOUNTER FOR SCREENING FOR OTHER VIRAL DISEASES: ICD-10-CM

## 2020-07-06 DIAGNOSIS — R10.9 UNSPECIFIED ABDOMINAL PAIN: ICD-10-CM

## 2020-07-06 DIAGNOSIS — N13.2 HYDRONEPHROSIS WITH RENAL AND URETERAL CALCULOUS OBSTRUCTION: ICD-10-CM

## 2020-07-07 ENCOUNTER — RESULT REVIEW (OUTPATIENT)
Age: 40
End: 2020-07-07

## 2020-07-07 ENCOUNTER — INPATIENT (INPATIENT)
Facility: HOSPITAL | Age: 40
LOS: 0 days | Discharge: HOME HEALTH | End: 2020-07-08
Attending: SURGERY | Admitting: SURGERY
Payer: COMMERCIAL

## 2020-07-07 ENCOUNTER — APPOINTMENT (OUTPATIENT)
Dept: UROLOGY | Facility: HOSPITAL | Age: 40
End: 2020-07-07

## 2020-07-07 VITALS
RESPIRATION RATE: 16 BRPM | HEART RATE: 79 BPM | DIASTOLIC BLOOD PRESSURE: 85 MMHG | WEIGHT: 220.02 LBS | TEMPERATURE: 98 F | OXYGEN SATURATION: 97 % | SYSTOLIC BLOOD PRESSURE: 131 MMHG

## 2020-07-07 DIAGNOSIS — Z98.890 OTHER SPECIFIED POSTPROCEDURAL STATES: Chronic | ICD-10-CM

## 2020-07-07 DIAGNOSIS — Z96.0 PRESENCE OF UROGENITAL IMPLANTS: Chronic | ICD-10-CM

## 2020-07-07 PROCEDURE — 50081 PERQ NL/PL LITHOTRP CPLX>2CM: CPT | Mod: LT

## 2020-07-07 PROCEDURE — 99152 MOD SED SAME PHYS/QHP 5/>YRS: CPT

## 2020-07-07 PROCEDURE — 50437 DILAT XST TRC NEW ACCESS RCS: CPT | Mod: LT

## 2020-07-07 PROCEDURE — 74425 UROGRAPHY ANTEGRADE RS&I: CPT | Mod: 26

## 2020-07-07 PROCEDURE — 50387 CHANGE NEPHROURETERAL CATH: CPT | Mod: 59,LT

## 2020-07-07 PROCEDURE — 74018 RADEX ABDOMEN 1 VIEW: CPT | Mod: 26

## 2020-07-07 RX ORDER — ONDANSETRON 8 MG/1
4 TABLET, FILM COATED ORAL ONCE
Refills: 0 | Status: DISCONTINUED | OUTPATIENT
Start: 2020-07-07 | End: 2020-07-08

## 2020-07-07 RX ORDER — SODIUM CHLORIDE 9 MG/ML
1000 INJECTION, SOLUTION INTRAVENOUS
Refills: 0 | Status: DISCONTINUED | OUTPATIENT
Start: 2020-07-07 | End: 2020-07-08

## 2020-07-07 RX ORDER — OXYCODONE AND ACETAMINOPHEN 5; 325 MG/1; MG/1
2 TABLET ORAL EVERY 6 HOURS
Refills: 0 | Status: DISCONTINUED | OUTPATIENT
Start: 2020-07-07 | End: 2020-07-08

## 2020-07-07 RX ORDER — HYDROMORPHONE HYDROCHLORIDE 2 MG/ML
0.5 INJECTION INTRAMUSCULAR; INTRAVENOUS; SUBCUTANEOUS
Refills: 0 | Status: DISCONTINUED | OUTPATIENT
Start: 2020-07-07 | End: 2020-07-07

## 2020-07-07 RX ORDER — OXYCODONE AND ACETAMINOPHEN 5; 325 MG/1; MG/1
1 TABLET ORAL ONCE
Refills: 0 | Status: DISCONTINUED | OUTPATIENT
Start: 2020-07-07 | End: 2020-07-07

## 2020-07-07 RX ORDER — HYDROMORPHONE HYDROCHLORIDE 2 MG/ML
2 INJECTION INTRAMUSCULAR; INTRAVENOUS; SUBCUTANEOUS
Refills: 0 | Status: DISCONTINUED | OUTPATIENT
Start: 2020-07-07 | End: 2020-07-08

## 2020-07-07 RX ORDER — MORPHINE SULFATE 50 MG/1
2 CAPSULE, EXTENDED RELEASE ORAL
Refills: 0 | Status: DISCONTINUED | OUTPATIENT
Start: 2020-07-07 | End: 2020-07-08

## 2020-07-07 RX ADMIN — HYDROMORPHONE HYDROCHLORIDE 0.5 MILLIGRAM(S): 2 INJECTION INTRAMUSCULAR; INTRAVENOUS; SUBCUTANEOUS at 21:02

## 2020-07-07 RX ADMIN — OXYCODONE AND ACETAMINOPHEN 1 TABLET(S): 5; 325 TABLET ORAL at 22:10

## 2020-07-07 RX ADMIN — SODIUM CHLORIDE 100 MILLILITER(S): 9 INJECTION, SOLUTION INTRAVENOUS at 21:50

## 2020-07-07 RX ADMIN — HYDROMORPHONE HYDROCHLORIDE 0.5 MILLIGRAM(S): 2 INJECTION INTRAMUSCULAR; INTRAVENOUS; SUBCUTANEOUS at 21:44

## 2020-07-07 RX ADMIN — MORPHINE SULFATE 2 MILLIGRAM(S): 50 CAPSULE, EXTENDED RELEASE ORAL at 22:55

## 2020-07-07 RX ADMIN — HYDROMORPHONE HYDROCHLORIDE 0.5 MILLIGRAM(S): 2 INJECTION INTRAMUSCULAR; INTRAVENOUS; SUBCUTANEOUS at 21:27

## 2020-07-07 NOTE — ASU DISCHARGE PLAN (ADULT/PEDIATRIC) - PROVIDER TOKENS
PROVIDER:[TOKEN:[[id number]],ESTABLISHEDPATIENT:[T]],PROVIDER:[TOKEN:[[id number]],ESTABLISHEDPATIENT:[T]]

## 2020-07-07 NOTE — BRIEF OPERATIVE NOTE - OPERATION/FINDINGS
lot of bleeding due to a posterior flap  no fragments left visually but bleeding interfered with vision

## 2020-07-07 NOTE — CHART NOTE - NSCHARTNOTEFT_GEN_A_CORE
PACU ANESTHESIA ADMISSION NOTE      Procedure: Nephrostography: left  Change external ureteral stent: left  Percutaneous nephrostolithotomy with lithotripsy of large calculus    Post op diagnosis:  Left renal stone      ____  Intubated  TV:______       Rate: ______      FiO2: ______    _x___  Patent Airway    _x___  Full return of protective reflexes    _x___  Full recovery from anesthesia / back to baseline status    Vitals:            T: 97.6               BP :124/82                R: 16             Sat: 96              P:96      Mental Status:  _x___ Awake   _____ Alert   _____ Drowsy   _____ Sedated    Nausea/Vomiting:  _x___  NO       ______Yes,   See Post - Op Orders         Pain Scale (0-10):  __0___    Treatment: _x___ None    ____ See Post - Op/PCA Orders    Post - Operative Fluids:   __x__ Oral   ____ See Post - Op Orders    Plan: Discharge:   _x___Home       _____Floor     _____Critical Care    _____  Other:_________________    Comments:  No anesthesia issues or complications noted.  Discharge when criteria met.

## 2020-07-07 NOTE — PRE-ANESTHESIA EVALUATION ADULT - NSANTHADDINFOFT_GEN_ALL_CORE
Procedure/Risks explained for GA with ETT + routine monitoring. Patient understands stated plan and agrees to proceed.

## 2020-07-07 NOTE — PROGRESS NOTE ADULT - SUBJECTIVE AND OBJECTIVE BOX
PREOPERATIVE DAY OF PROCEDURE EVALUATION:     I have personally seen and examined this patient. I agree with the history and physical which I have reviewed and noted any changes below:     Plan is for image guided port placement with conscious sedation    Procedure/ risks/ benefits/ goals/ alternatives were explained. All questions answered. Informed content obtained from patient. Consent placed in chart.

## 2020-07-07 NOTE — BRIEF OPERATIVE NOTE - NSICDXBRIEFPROCEDURE_GEN_ALL_CORE_FT
PROCEDURES:  Nephrostography 07-Jul-2020 20:16:56 left Cami Vann  Change external ureteral stent 07-Jul-2020 20:16:50 left Cami Vann  Percutaneous nephrostolithotomy with lithotripsy of large calculus 07-Jul-2020 20:16:44  Cami Vann

## 2020-07-07 NOTE — PROGRESS NOTE ADULT - SUBJECTIVE AND OBJECTIVE BOX
INTERVENTIONAL RADIOLOGY BRIEF-OPERATIVE NOTE    Procedure: Left percutaneous nephroureterostomy	    Pre-Op Diagnosis: Nephrolithiasis    Post-Op Diagnosis: Same    Attending: Elliot Landau, MD  Resident: Pop Shaw MD    Anesthesia (type):  [ ] General Anesthesia  [x] Sedation - 4 mg Versed, 100 mcg fentanyl  [ ] Spinal Anesthesia  [x] Local/Regional    Contrast: 10 cc Visipaque    Estimated Blood Loss: Minimal, < 5 cc    Condition:   [ ] Critical  [ ] Serious  [ ] Fair   [x] Good    Findings/Follow up Plan of Care: Status post placement of a 4F left PCNU. Procedure well-tolerated.    Specimens Removed: None.    Implants: None.    Complications: None immediate.    Disposition: Anticipate to OR after short interval monitoring in recovery.      Please call Interventional Radiology [phone] with any questions, concerns, or issues.

## 2020-07-07 NOTE — ASU DISCHARGE PLAN (ADULT/PEDIATRIC) - CARE PROVIDER_API CALL
LANDAU, ELI  [id number]  [phone] [place] [address]  Phone: [phone]  Fax: [phone]  Established Patient  Follow Up Time:     Cami Vann  Urology  [address] [place] [address]  Phone: [phone]  Fax: [phone]  Established Patient  Follow Up Time:

## 2020-07-07 NOTE — ASU DISCHARGE PLAN (ADULT/PEDIATRIC) - CALL YOUR DOCTOR IF YOU HAVE ANY OF THE FOLLOWING:
Increased irritability or sluggishness/Pain not relieved by Medications/Inability to tolerate liquids or foods/Nausea and vomiting that does not stop/Bleeding that does not stop/Fever greater than (need to indicate Fahrenheit or Celsius)/Excessive diarrhea

## 2020-07-08 ENCOUNTER — TRANSCRIPTION ENCOUNTER (OUTPATIENT)
Age: 40
End: 2020-07-08

## 2020-07-08 VITALS
DIASTOLIC BLOOD PRESSURE: 75 MMHG | HEART RATE: 100 BPM | RESPIRATION RATE: 18 BRPM | SYSTOLIC BLOOD PRESSURE: 119 MMHG | TEMPERATURE: 99 F

## 2020-07-08 LAB
ANION GAP SERPL CALC-SCNC: 12 MMOL/L — SIGNIFICANT CHANGE UP (ref 7–14)
BUN SERPL-MCNC: 13 MG/DL — SIGNIFICANT CHANGE UP (ref 10–20)
CALCIUM SERPL-MCNC: 9.3 MG/DL — SIGNIFICANT CHANGE UP (ref 8.5–10.1)
CHLORIDE SERPL-SCNC: 102 MMOL/L — SIGNIFICANT CHANGE UP (ref 98–110)
CO2 SERPL-SCNC: 26 MMOL/L — SIGNIFICANT CHANGE UP (ref 17–32)
CREAT SERPL-MCNC: 1.1 MG/DL — SIGNIFICANT CHANGE UP (ref 0.7–1.5)
GLUCOSE SERPL-MCNC: 148 MG/DL — HIGH (ref 70–99)
HCT VFR BLD CALC: 40.5 % — LOW (ref 42–52)
HGB BLD-MCNC: 13.6 G/DL — LOW (ref 14–18)
MCHC RBC-ENTMCNC: 30 PG — SIGNIFICANT CHANGE UP (ref 27–31)
MCHC RBC-ENTMCNC: 33.6 G/DL — SIGNIFICANT CHANGE UP (ref 32–37)
MCV RBC AUTO: 89.4 FL — SIGNIFICANT CHANGE UP (ref 80–94)
NRBC # BLD: 0 /100 WBCS — SIGNIFICANT CHANGE UP (ref 0–0)
PLATELET # BLD AUTO: 312 K/UL — SIGNIFICANT CHANGE UP (ref 130–400)
POTASSIUM SERPL-MCNC: 5.2 MMOL/L — HIGH (ref 3.5–5)
POTASSIUM SERPL-SCNC: 5.2 MMOL/L — HIGH (ref 3.5–5)
RBC # BLD: 4.53 M/UL — LOW (ref 4.7–6.1)
RBC # FLD: 12.2 % — SIGNIFICANT CHANGE UP (ref 11.5–14.5)
SODIUM SERPL-SCNC: 140 MMOL/L — SIGNIFICANT CHANGE UP (ref 135–146)
WBC # BLD: 8.46 K/UL — SIGNIFICANT CHANGE UP (ref 4.8–10.8)
WBC # FLD AUTO: 8.46 K/UL — SIGNIFICANT CHANGE UP (ref 4.8–10.8)

## 2020-07-08 PROCEDURE — 74176 CT ABD & PELVIS W/O CONTRAST: CPT | Mod: 26

## 2020-07-08 PROCEDURE — 99152 MOD SED SAME PHYS/QHP 5/>YRS: CPT

## 2020-07-08 PROCEDURE — 50693 PLMT URETERAL STENT PRQ: CPT | Mod: LT

## 2020-07-08 RX ORDER — ACETAMINOPHEN 500 MG
650 TABLET ORAL EVERY 6 HOURS
Refills: 0 | Status: DISCONTINUED | OUTPATIENT
Start: 2020-07-08 | End: 2020-07-08

## 2020-07-08 RX ORDER — POTASSIUM CITRATE MONOHYDRATE 100 %
1 POWDER (GRAM) MISCELLANEOUS
Qty: 60 | Refills: 1
Start: 2020-07-08 | End: 2020-09-05

## 2020-07-08 RX ORDER — TRAMADOL HYDROCHLORIDE 50 MG/1
1 TABLET ORAL
Qty: 4 | Refills: 0
Start: 2020-07-08 | End: 2020-07-08

## 2020-07-08 NOTE — PROGRESS NOTE ADULT - ASSESSMENT
39 y/o m s/p left PCNL    A) stable POD # 1  Nephrolithiasis    P) d/c Boothe  CT scan Abd/pelvis stone protocol  IR consult for antegrade nephrostogram  Anticipate d/c for later today.

## 2020-07-08 NOTE — PROGRESS NOTE ADULT - SUBJECTIVE AND OBJECTIVE BOX
Progress Note  POD # 1    Subjective  39 y/o Male s/p left PCNL. Pt doing better this am less pain.  Pt with c/o discomfort from Boothe wants it removed.    [x] a 10 point review of systems was negative except where noted    [  ]  Due to altered mental status/intubation, subjective information was not able t be obtained from the patient.  History was obtained, to the extent possible, from review of the chart and collateral sources of information.    Vital signs  T(C): , Max: 36.9 (07-08-20 @ 01:45)  HR: 113 (07-08-20 @ 05:18)  BP: 110/64 (07-08-20 @ 05:18)  SpO2: 96% (07-08-20 @ 02:32)    Gen NC/AT in NAD  SKIN warm, dry with good tugor  Neck supple, FROM  LUNGS No dyspnea, No accessory muscle use  BACK No CVAT, Nephrostomy draining blood tinged urine.  ABD    Soft non tender, bladder not palpable   Boothe draining blood tinged urine      Labs                        13.6   8.46  )-----------( 312      ( 08 Jul 2020 07:25 )             40.5

## 2020-07-08 NOTE — DISCHARGE NOTE PROVIDER - NSDCCPTREATMENT_GEN_ALL_CORE_FT
PRINCIPAL PROCEDURE  Procedure: Percutaneous nephrostolithotomy with lithotripsy of large calculus  Findings and Treatment:       SECONDARY PROCEDURE  Procedure: Nephrostography  Findings and Treatment: left    Procedure: Change external ureteral stent  Findings and Treatment: left

## 2020-07-08 NOTE — PROGRESS NOTE ADULT - SUBJECTIVE AND OBJECTIVE BOX
INTERVENTIONAL RADIOLOGY BRIEF-OPERATIVE NOTE    Procedure:  Over-the-wire left nephrostogram with placement of JJ left ureteral stent.    Pre-Op Diagnosis:  Left nephrolithiasis    Post-Op Diagnosis:  Same; mid left ureteral obstruction    Attending:   Dr. Newell  Resident:   None    Anesthesia (type):  [ ] General Anesthesia  [X] Sedation-- Versed, 3 mg and Fentanyl, 75 mcg, iv (in divided doses)  [ ] Spinal Anesthesia  [X] Local/Regional-- 1% Lidocaine, SQ, left flank    Total Face-to-Face Sedation Time:  34 minutes    Contrast:  Visipaque    Estimated Blood Loss:  5 cc    Condition:   [ ] Critical  [ ] Serious  [ ] Fair   [X] Good    Findings/Follow up Plan of Care:  Non-obstructing 4mm x 5mm x 8mm left ureteral calculus (HU, 250-300) remains.  Left ureter obstructs in middle third.  10/8-Fr, 26 cm JJ left ureteral stent placed with resultant prompt drainage to the bladder.  Patient tolerated very well, without incident.  Findings d/w patient and Dr. Vann with read-back.    Specimens Removed:  None    Implants:  None    Complications:  None immediate    Disposition:  f/u Dr. Vann      Please call Interventional Radiology [phone] with any questions, concerns, or issues.

## 2020-07-08 NOTE — DISCHARGE NOTE PROVIDER - NSDCACTIVITY_GEN_ALL_CORE
Showering allowed/No heavy lifting/straining/Sex allowed/Driving allowed/Walking - Indoors allowed/Stairs allowed

## 2020-07-08 NOTE — DISCHARGE NOTE PROVIDER - HOSPITAL COURSE
39 y/o M with PMH of mild Left hydronephrosis 2/2 a 2 x 0.9 x 1.5cm LEFT UPJ stone s/p left PCNL and placement of nephroureteral stent 7/7/20. S/P CT percutaneous antegrade nephrostogram today with CT findings of  revealed 8 x 4 x 5 mm residual stone fragment noted proximally in an interpolar  calyx, additional non-obstructing punctuate renal calculi. Doing well  states to mild hematuria today. Plan is d/c home in stable condition. 39 y/o M with PMH of mild Left hydronephrosis 2/2 a 2 x 0.9 x 1.5cm LEFT UPJ stone s/p left PCNL and placement of nephroureteral stent 7/7/20. S/P CT percutaneous antegrade nephrostogram today with CT findings of  revealed 8 x 4 x 5 mm residual stone fragment noted proximally in an interpolar  calyx, additional non-obstructing punctuate renal calculi. Doing well  states to hematuria with some blood clots  today, able to urinate. States n pain well controlled. Awaiting D/C home today. 41 y/o M with PMH of mild Left hydronephrosis 2/2 a 2 x 0.9 x 1.5cm LEFT UPJ stone s/p left PCNL and placement of nephroureteral stent 7/7/20. S/P CT percutaneous antegrade nephrostogram today with CT findings of  revealed 8 x 4 x 5 mm residual stone fragment noted proximally in an interpolar  calyx, </= 300 HU additional non-obstructing punctuate renal calculi. He had edema in mid ureter so JJ placed. Doing well  states to hematuria with some blood clots  today, able to urinate. States pain well controlled. Awaiting D/C home today.

## 2020-07-08 NOTE — PROGRESS NOTE ADULT - SUBJECTIVE AND OBJECTIVE BOX
· Subjective and Objective: 	  Pt is s/p Left PCNL this evening. Pt c/o pain 9/10 unrelieved by Percocet and Dilaudid earlier. Other lenz pt has no other complaint  On exam pt VS are stable, abdomen is soft NT ND, Left PCN draining slight blood tinged urine  Boothe catheter in place draining bloody urine w/o any clots  Bilateral BS  HR regular  Mving all extremitie, motor and sensory grossly intact  Follows commmands no neuro deficits  Spoke w/ Attdg regarding pain management of pt. Since pt is due to have stentogram in am will admit pt for overnight observation and pain control

## 2020-07-08 NOTE — PROGRESS NOTE ADULT - SUBJECTIVE AND OBJECTIVE BOX
Patient Age: 40y    Patient Gender:  Male    Procedure (including site / side if known):  Over-the-wire left nephrostogram with possible catheter exchange/removal    Diagnosis / Indication:  Left nephrolithiasis    Interventional Radiology Attending Physician:  Dr. Newell    Ordering Attending Physician:  Dr. Vann    Pertinent Medical History:  Left nephrolithiasis    PAST MEDICAL & SURGICAL HISTORY:  No pertinent past medical history  S/P ureteral stent placement: per patient stent placement to left kidney  H/O knee surgery      Allergies:  No Known Allergies      Pertinent Labs:                        13.6   8.46  )-----------( 312      ( 08 Jul 2020 07:25 )             40.5       07-08    140  |  102  |  13  ----------------------------<  148<H>  5.2<H>   |  26  |  1.1    Ca    9.3      08 Jul 2020 07:25      Consentable:  Yes    NPO:  Yes    Code Status:  Full Code [ ]     Patient and Family aware:   Yes      Risks, benefits, and alternatives to treatment discussed. All questions answered with understanding.    Please call [phone] with any further questions.

## 2020-07-08 NOTE — DISCHARGE NOTE PROVIDER - NSDCFUADDINST_GEN_ALL_CORE_FT
Drink plenty of fluids   Use stool softeners  and laxative as needed   Empty urostomy bag as needed, may remove when drainage is stopped.   Cont Tramadol 50 Q6H  for pain as needed. Drink plenty of fluids   Use stool softeners  and laxative as needed   Empty urostomy bag as needed, may remove when drainage is stopped.   Cont Tramadol 50mg  Q6H  for pain as needed.

## 2020-07-09 LAB — SURGICAL PATHOLOGY STUDY: SIGNIFICANT CHANGE UP

## 2020-07-10 ENCOUNTER — APPOINTMENT (OUTPATIENT)
Dept: UROLOGY | Facility: CLINIC | Age: 40
End: 2020-07-10
Payer: COMMERCIAL

## 2020-07-10 VITALS
SYSTOLIC BLOOD PRESSURE: 136 MMHG | TEMPERATURE: 97.9 F | DIASTOLIC BLOOD PRESSURE: 81 MMHG | WEIGHT: 217.6 LBS | BODY MASS INDEX: 29.15 KG/M2 | HEIGHT: 72.44 IN | HEART RATE: 99 BPM

## 2020-07-10 DIAGNOSIS — Z00.00 ENCOUNTER FOR GENERAL ADULT MEDICAL EXAMINATION W/OUT ABNORMAL FINDINGS: ICD-10-CM

## 2020-07-10 PROCEDURE — 99214 OFFICE O/P EST MOD 30 MIN: CPT | Mod: 24

## 2020-07-10 NOTE — LETTER HEADER
[FreeTextEntry3] : Cami Vann M.D.\par Director of Urology\par [facility]\par [address] [place] [address], [address]\[place] [address]

## 2020-07-10 NOTE — LETTER BODY
[Courtesy Letter:] : I had the pleasure of seeing your patient, [unfilled], in my office today. [Sincerely,] : Sincerely, [Please see my note below.] : Please see my note below. [FreeTextEntry2] : To whom it may concern;

## 2020-07-10 NOTE — HISTORY OF PRESENT ILLNESS
[Hematuria - Gross] : gross hematuria [FreeTextEntry1] : Joseph is a 40-year-old male who on Tuesday underwent a PCNL was admitted post offer pain and had a CT stent diagram the next day. It showed an 8 by 4 x 6 mm residual stone. The stent diagram showed edema in the midureter so the interventional radiologist placed a double J, we sent him home are potassium citrate and is here to discuss a story.\par \par Since discharge the bag that on his left flank has been empty.\par

## 2020-07-10 NOTE — ASSESSMENT
[FreeTextEntry1] : Physical exam shows no CVA or abdominal tenderness. I removed the bag remove the stitches and applied a Band-Aid.\par \par I discussed with them how to dissolve the stone data keep the pH between six and seven. If he goes over seven he precipitates calcium phosphate if it is under six it doesn’t sell. Will give them about 2 to 3 weeks to dissolve the stone get an ultrasound at that time and if it shows no further stone we will take out the double J here in the office. He is extremely concerned about that and I made arrangements with him that we will try it. If he can tolerate it then we will stop and do it under anesthesia. If he can tolerate it it’s quicker easier and less expensive.\par \par We then discuss stone prevention which in include hydration, hydration, hydration as well as dietary issues avoiding high calcium, oxalate, animal protein and products rich in purine such as almonds something that they both love. We also discussed timing i.e. if you’re going to have cheese and you going to have broccoli had the two together so they binding go out in the stool rather than she is for breakfast, broccoli for lunch and then had them go out and you urine informed calcium oxalate stones\par

## 2020-07-10 NOTE — PHYSICAL EXAM
[General Appearance - Well Nourished] : well nourished [General Appearance - Well Developed] : well developed [Well Groomed] : well groomed [Normal Appearance] : normal appearance [General Appearance - In No Acute Distress] : no acute distress [Costovertebral Angle Tenderness] : no ~M costovertebral angle tenderness [Abdomen Soft] : soft [Abdomen Tenderness] : non-tender [FreeTextEntry1] : bag dry removed stitch removed band aid applied [Urethral Meatus] : meatus normal [Penis Abnormality] : normal uncircumcised penis [Testes Tenderness] : no tenderness of the testes [Edema] : no peripheral edema [Respiration, Rhythm And Depth] : normal respiratory rhythm and effort [] : no respiratory distress [Exaggerated Use Of Accessory Muscles For Inspiration] : no accessory muscle use [Mood] : the mood was normal [Affect] : the affect was normal [Oriented To Time, Place, And Person] : oriented to person, place, and time [Not Anxious] : not anxious [Normal Station and Gait] : the gait and station were normal for the patient's age
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
## 2020-07-13 DIAGNOSIS — N13.2 HYDRONEPHROSIS WITH RENAL AND URETERAL CALCULOUS OBSTRUCTION: ICD-10-CM

## 2020-07-13 DIAGNOSIS — R31.9 HEMATURIA, UNSPECIFIED: ICD-10-CM

## 2020-07-13 DIAGNOSIS — N99.61 INTRAOPERATIVE HEMORRHAGE AND HEMATOMA OF A GENITOURINARY SYSTEM ORGAN OR STRUCTURE COMPLICATING A GENITOURINARY SYSTEM PROCEDURE: ICD-10-CM

## 2020-07-14 LAB — NIDUS STONE QN: SIGNIFICANT CHANGE UP

## 2020-08-13 ENCOUNTER — LABORATORY RESULT (OUTPATIENT)
Age: 40
End: 2020-08-13

## 2020-08-17 ENCOUNTER — OUTPATIENT (OUTPATIENT)
Dept: OUTPATIENT SERVICES | Facility: HOSPITAL | Age: 40
LOS: 1 days | Discharge: HOME | End: 2020-08-17
Payer: COMMERCIAL

## 2020-08-17 DIAGNOSIS — Z96.0 PRESENCE OF UROGENITAL IMPLANTS: Chronic | ICD-10-CM

## 2020-08-17 DIAGNOSIS — N20.0 CALCULUS OF KIDNEY: ICD-10-CM

## 2020-08-17 DIAGNOSIS — Z98.890 OTHER SPECIFIED POSTPROCEDURAL STATES: Chronic | ICD-10-CM

## 2020-08-17 PROCEDURE — 76775 US EXAM ABDO BACK WALL LIM: CPT | Mod: 26

## 2020-08-21 ENCOUNTER — APPOINTMENT (OUTPATIENT)
Dept: UROLOGY | Facility: CLINIC | Age: 40
End: 2020-08-21
Payer: COMMERCIAL

## 2020-08-21 VITALS
HEART RATE: 114 BPM | BODY MASS INDEX: 29.39 KG/M2 | HEIGHT: 72 IN | SYSTOLIC BLOOD PRESSURE: 140 MMHG | TEMPERATURE: 98.7 F | WEIGHT: 217 LBS | DIASTOLIC BLOOD PRESSURE: 89 MMHG

## 2020-08-21 PROCEDURE — 99024 POSTOP FOLLOW-UP VISIT: CPT

## 2020-08-21 RX ORDER — POTASSIUM CITRATE 10 MEQ/1
10 MEQ TABLET, EXTENDED RELEASE ORAL
Qty: 180 | Refills: 2 | Status: DISCONTINUED | COMMUNITY
Start: 2020-07-10 | End: 2020-08-21

## 2020-08-21 NOTE — LETTER BODY
[Please see my note below.] : Please see my note below. [Sincerely,] : Sincerely, [Courtesy Letter:] : I had the pleasure of seeing your patient, [unfilled], in my office today. [FreeTextEntry2] : To whom it may concern;

## 2020-08-21 NOTE — HISTORY OF PRESENT ILLNESS
[Dysuria] : dysuria [FreeTextEntry1] : left pcnl 07/07/2020 with residual 8 x 4 x 5 mm tried to alkalinize it away \par says ph routinely about 6.5 sono done here to review and decide \par current issues = dysuria and severe urgency [Hematuria - Gross] : no gross hematuria

## 2020-08-21 NOTE — ASSESSMENT
[FreeTextEntry1] : stone was mixture ca oxalate 70 / uric acid  20 / Ca PO4 10 %\par no change with urocit-k\par needs ureteroscopy

## 2020-08-21 NOTE — PHYSICAL EXAM
[General Appearance - Well Developed] : well developed [General Appearance - Well Nourished] : well nourished [Normal Appearance] : normal appearance [Well Groomed] : well groomed [General Appearance - In No Acute Distress] : no acute distress [Abdomen Soft] : soft [Abdomen Tenderness] : non-tender [Urethral Meatus] : meatus normal [Costovertebral Angle Tenderness] : no ~M costovertebral angle tenderness [Penis Abnormality] : normal uncircumcised penis [Testes Tenderness] : no tenderness of the testes [Epididymis] : the epididymides were normal [Edema] : no peripheral edema [Heart Rate And Rhythm] : Heart rate and rhythm were normal [Exaggerated Use Of Accessory Muscles For Inspiration] : no accessory muscle use [] : no respiratory distress [Respiration, Rhythm And Depth] : normal respiratory rhythm and effort [Auscultation Breath Sounds / Voice Sounds] : lungs were clear to auscultation bilaterally [Oriented To Time, Place, And Person] : oriented to person, place, and time [Mood] : the mood was normal [Not Anxious] : not anxious [Affect] : the affect was normal [Normal Station and Gait] : the gait and station were normal for the patient's age

## 2020-08-24 LAB
APPEARANCE: ABNORMAL
BILIRUBIN URINE: NEGATIVE
BLOOD URINE: ABNORMAL
COLOR: YELLOW
GLUCOSE QUALITATIVE U: NEGATIVE
KETONES URINE: NEGATIVE
LEUKOCYTE ESTERASE URINE: ABNORMAL
NITRITE URINE: NEGATIVE
PH URINE: 7
PROTEIN URINE: ABNORMAL
SPECIFIC GRAVITY URINE: 1.02
UROBILINOGEN URINE: NORMAL

## 2020-09-09 ENCOUNTER — OUTPATIENT (OUTPATIENT)
Dept: OUTPATIENT SERVICES | Facility: HOSPITAL | Age: 40
LOS: 1 days | Discharge: HOME | End: 2020-09-09
Payer: COMMERCIAL

## 2020-09-09 VITALS
HEIGHT: 71 IN | DIASTOLIC BLOOD PRESSURE: 95 MMHG | OXYGEN SATURATION: 100 % | RESPIRATION RATE: 16 BRPM | WEIGHT: 214.95 LBS | TEMPERATURE: 98 F | SYSTOLIC BLOOD PRESSURE: 139 MMHG | HEART RATE: 70 BPM

## 2020-09-09 DIAGNOSIS — Z01.818 ENCOUNTER FOR OTHER PREPROCEDURAL EXAMINATION: ICD-10-CM

## 2020-09-09 DIAGNOSIS — N20.0 CALCULUS OF KIDNEY: ICD-10-CM

## 2020-09-09 DIAGNOSIS — Z98.890 OTHER SPECIFIED POSTPROCEDURAL STATES: Chronic | ICD-10-CM

## 2020-09-09 DIAGNOSIS — Z96.0 PRESENCE OF UROGENITAL IMPLANTS: Chronic | ICD-10-CM

## 2020-09-09 LAB
ALBUMIN SERPL ELPH-MCNC: 4.9 G/DL — SIGNIFICANT CHANGE UP (ref 3.5–5.2)
ALP SERPL-CCNC: 62 U/L — SIGNIFICANT CHANGE UP (ref 30–115)
ALT FLD-CCNC: 32 U/L — SIGNIFICANT CHANGE UP (ref 0–41)
ANION GAP SERPL CALC-SCNC: 12 MMOL/L — SIGNIFICANT CHANGE UP (ref 7–14)
APPEARANCE UR: ABNORMAL
APTT BLD: 34.8 SEC — SIGNIFICANT CHANGE UP (ref 27–39.2)
AST SERPL-CCNC: 21 U/L — SIGNIFICANT CHANGE UP (ref 0–41)
BACTERIA # UR AUTO: NEGATIVE — SIGNIFICANT CHANGE UP
BASOPHILS # BLD AUTO: 0.09 K/UL — SIGNIFICANT CHANGE UP (ref 0–0.2)
BASOPHILS NFR BLD AUTO: 1.4 % — HIGH (ref 0–1)
BILIRUB SERPL-MCNC: <0.2 MG/DL — SIGNIFICANT CHANGE UP (ref 0.2–1.2)
BILIRUB UR-MCNC: NEGATIVE — SIGNIFICANT CHANGE UP
BUN SERPL-MCNC: 16 MG/DL — SIGNIFICANT CHANGE UP (ref 10–20)
CALCIUM SERPL-MCNC: 9.8 MG/DL — SIGNIFICANT CHANGE UP (ref 8.5–10.1)
CHLORIDE SERPL-SCNC: 101 MMOL/L — SIGNIFICANT CHANGE UP (ref 98–110)
CO2 SERPL-SCNC: 25 MMOL/L — SIGNIFICANT CHANGE UP (ref 17–32)
COLOR SPEC: SIGNIFICANT CHANGE UP
CREAT SERPL-MCNC: 1.1 MG/DL — SIGNIFICANT CHANGE UP (ref 0.7–1.5)
DIFF PNL FLD: ABNORMAL
EOSINOPHIL # BLD AUTO: 0.32 K/UL — SIGNIFICANT CHANGE UP (ref 0–0.7)
EOSINOPHIL NFR BLD AUTO: 4.8 % — SIGNIFICANT CHANGE UP (ref 0–8)
EPI CELLS # UR: 1 /HPF — SIGNIFICANT CHANGE UP (ref 0–5)
GLUCOSE SERPL-MCNC: 82 MG/DL — SIGNIFICANT CHANGE UP (ref 70–99)
GLUCOSE UR QL: NEGATIVE — SIGNIFICANT CHANGE UP
HCT VFR BLD CALC: 44.6 % — SIGNIFICANT CHANGE UP (ref 42–52)
HGB BLD-MCNC: 14.8 G/DL — SIGNIFICANT CHANGE UP (ref 14–18)
HYALINE CASTS # UR AUTO: 2 /LPF — SIGNIFICANT CHANGE UP (ref 0–7)
IMM GRANULOCYTES NFR BLD AUTO: 0.3 % — SIGNIFICANT CHANGE UP (ref 0.1–0.3)
INR BLD: 0.9 RATIO — SIGNIFICANT CHANGE UP (ref 0.65–1.3)
KETONES UR-MCNC: NEGATIVE — SIGNIFICANT CHANGE UP
LEUKOCYTE ESTERASE UR-ACNC: NEGATIVE — SIGNIFICANT CHANGE UP
LYMPHOCYTES # BLD AUTO: 2.14 K/UL — SIGNIFICANT CHANGE UP (ref 1.2–3.4)
LYMPHOCYTES # BLD AUTO: 32.3 % — SIGNIFICANT CHANGE UP (ref 20.5–51.1)
MCHC RBC-ENTMCNC: 29.8 PG — SIGNIFICANT CHANGE UP (ref 27–31)
MCHC RBC-ENTMCNC: 33.2 G/DL — SIGNIFICANT CHANGE UP (ref 32–37)
MCV RBC AUTO: 89.7 FL — SIGNIFICANT CHANGE UP (ref 80–94)
MONOCYTES # BLD AUTO: 0.74 K/UL — HIGH (ref 0.1–0.6)
MONOCYTES NFR BLD AUTO: 11.2 % — HIGH (ref 1.7–9.3)
NEUTROPHILS # BLD AUTO: 3.32 K/UL — SIGNIFICANT CHANGE UP (ref 1.4–6.5)
NEUTROPHILS NFR BLD AUTO: 50 % — SIGNIFICANT CHANGE UP (ref 42.2–75.2)
NITRITE UR-MCNC: NEGATIVE — SIGNIFICANT CHANGE UP
NRBC # BLD: 0 /100 WBCS — SIGNIFICANT CHANGE UP (ref 0–0)
PH UR: 6 — SIGNIFICANT CHANGE UP (ref 5–8)
PLATELET # BLD AUTO: 357 K/UL — SIGNIFICANT CHANGE UP (ref 130–400)
POTASSIUM SERPL-MCNC: 4.3 MMOL/L — SIGNIFICANT CHANGE UP (ref 3.5–5)
POTASSIUM SERPL-SCNC: 4.3 MMOL/L — SIGNIFICANT CHANGE UP (ref 3.5–5)
PROT SERPL-MCNC: 7.8 G/DL — SIGNIFICANT CHANGE UP (ref 6–8)
PROT UR-MCNC: ABNORMAL
PROTHROM AB SERPL-ACNC: 10.4 SEC — SIGNIFICANT CHANGE UP (ref 9.95–12.87)
RBC # BLD: 4.97 M/UL — SIGNIFICANT CHANGE UP (ref 4.7–6.1)
RBC # FLD: 12.4 % — SIGNIFICANT CHANGE UP (ref 11.5–14.5)
RBC CASTS # UR COMP ASSIST: 50 /HPF — HIGH (ref 0–4)
SODIUM SERPL-SCNC: 138 MMOL/L — SIGNIFICANT CHANGE UP (ref 135–146)
SP GR SPEC: 1.02 — SIGNIFICANT CHANGE UP (ref 1.01–1.02)
UROBILINOGEN FLD QL: SIGNIFICANT CHANGE UP
WBC # BLD: 6.63 K/UL — SIGNIFICANT CHANGE UP (ref 4.8–10.8)
WBC # FLD AUTO: 6.63 K/UL — SIGNIFICANT CHANGE UP (ref 4.8–10.8)
WBC UR QL: 8 /HPF — HIGH (ref 0–5)

## 2020-09-09 PROCEDURE — 93010 ELECTROCARDIOGRAM REPORT: CPT

## 2020-09-09 NOTE — H&P PST ADULT - HISTORY OF PRESENT ILLNESS
39 y/o male with PMH of kidney stones, multiple JJ stents placements, knee surgery is scheduled for left urethroscopy laser lithotripsy stone basketing removal ? replacement of JJ stent at [facility] Dr. Vann under general anesthesia. Pt  complains of left renal pain, dysuria, hematuria, urgency and frequency. Was treated for UTI resently by urologist. Denies any chest pain, difficulty breathing, SOB, palpitations, URI, or any other infections in the last 2 weeks/1 month. Denies any recent travel, contact, or exposure to any persons with known or suspected COVID-19. Pt also denies COVID testing within the last 2 weeks. Pt advised to self quarantine until day of procedure. Exercise tolerance of 2-3 flights of stairs without dyspnea. SONYA reviewed with patient.    Anesthesia Alert  NO--Difficult Airway  NO--History of neck surgery or radiation  NO--Limited ROM of neck  NO--History of Malignant hyperthermia  NO--Personal or family history of Pseudocholinesterase deficiency  NO--Prior Anesthesia Complication  NO--Latex Allergy  NO--Loose teeth  NO--History of Rheumatoid Arthritis  NO--SONYA  NO--Other_____

## 2020-09-09 NOTE — H&P PST ADULT - REASON FOR ADMISSION
left urethroscopy laser lithotripsy stone basketing removal ? replacement of JJ stent at [facility] Dr. Vann under general anesthesia on 9/15/2020.

## 2020-09-10 LAB
CULTURE RESULTS: NO GROWTH — SIGNIFICANT CHANGE UP
SPECIMEN SOURCE: SIGNIFICANT CHANGE UP

## 2020-09-12 ENCOUNTER — LABORATORY RESULT (OUTPATIENT)
Age: 40
End: 2020-09-12

## 2020-09-12 ENCOUNTER — OUTPATIENT (OUTPATIENT)
Dept: OUTPATIENT SERVICES | Facility: HOSPITAL | Age: 40
LOS: 1 days | Discharge: HOME | End: 2020-09-12

## 2020-09-12 DIAGNOSIS — Z11.59 ENCOUNTER FOR SCREENING FOR OTHER VIRAL DISEASES: ICD-10-CM

## 2020-09-12 DIAGNOSIS — Z98.890 OTHER SPECIFIED POSTPROCEDURAL STATES: Chronic | ICD-10-CM

## 2020-09-12 DIAGNOSIS — Z96.0 PRESENCE OF UROGENITAL IMPLANTS: Chronic | ICD-10-CM

## 2020-09-14 PROBLEM — N20.0 CALCULUS OF KIDNEY: Chronic | Status: ACTIVE | Noted: 2020-09-09

## 2020-09-15 ENCOUNTER — APPOINTMENT (OUTPATIENT)
Dept: UROLOGY | Facility: HOSPITAL | Age: 40
End: 2020-09-15

## 2020-09-15 ENCOUNTER — OUTPATIENT (OUTPATIENT)
Dept: OUTPATIENT SERVICES | Facility: HOSPITAL | Age: 40
LOS: 1 days | Discharge: HOME | End: 2020-09-15
Payer: COMMERCIAL

## 2020-09-15 VITALS
RESPIRATION RATE: 18 BRPM | WEIGHT: 214.95 LBS | SYSTOLIC BLOOD PRESSURE: 136 MMHG | TEMPERATURE: 98 F | HEIGHT: 71 IN | DIASTOLIC BLOOD PRESSURE: 88 MMHG | OXYGEN SATURATION: 98 % | HEART RATE: 72 BPM

## 2020-09-15 VITALS — HEART RATE: 60 BPM | SYSTOLIC BLOOD PRESSURE: 149 MMHG | DIASTOLIC BLOOD PRESSURE: 87 MMHG | RESPIRATION RATE: 18 BRPM

## 2020-09-15 DIAGNOSIS — Z96.0 PRESENCE OF UROGENITAL IMPLANTS: Chronic | ICD-10-CM

## 2020-09-15 DIAGNOSIS — Z98.890 OTHER SPECIFIED POSTPROCEDURAL STATES: Chronic | ICD-10-CM

## 2020-09-15 PROCEDURE — 52332 CYSTOSCOPY AND TREATMENT: CPT | Mod: LT,78

## 2020-09-15 PROCEDURE — 52352 CYSTOURETERO W/STONE REMOVE: CPT | Mod: LT,78

## 2020-09-15 RX ORDER — MEPERIDINE HYDROCHLORIDE 50 MG/ML
12.5 INJECTION INTRAMUSCULAR; INTRAVENOUS; SUBCUTANEOUS
Refills: 0 | Status: DISCONTINUED | OUTPATIENT
Start: 2020-09-15 | End: 2020-09-15

## 2020-09-15 RX ORDER — OXYCODONE AND ACETAMINOPHEN 5; 325 MG/1; MG/1
1 TABLET ORAL ONCE
Refills: 0 | Status: DISCONTINUED | OUTPATIENT
Start: 2020-09-15 | End: 2020-09-15

## 2020-09-15 RX ORDER — HYDROMORPHONE HYDROCHLORIDE 2 MG/ML
0.5 INJECTION INTRAMUSCULAR; INTRAVENOUS; SUBCUTANEOUS
Refills: 0 | Status: DISCONTINUED | OUTPATIENT
Start: 2020-09-15 | End: 2020-09-15

## 2020-09-15 RX ORDER — ONDANSETRON 8 MG/1
4 TABLET, FILM COATED ORAL ONCE
Refills: 0 | Status: DISCONTINUED | OUTPATIENT
Start: 2020-09-15 | End: 2020-09-15

## 2020-09-15 RX ORDER — HYDROMORPHONE HYDROCHLORIDE 2 MG/ML
1 INJECTION INTRAMUSCULAR; INTRAVENOUS; SUBCUTANEOUS
Refills: 0 | Status: DISCONTINUED | OUTPATIENT
Start: 2020-09-15 | End: 2020-09-15

## 2020-09-15 RX ORDER — SODIUM CHLORIDE 9 MG/ML
1000 INJECTION, SOLUTION INTRAVENOUS
Refills: 0 | Status: DISCONTINUED | OUTPATIENT
Start: 2020-09-15 | End: 2020-09-15

## 2020-09-15 RX ADMIN — SODIUM CHLORIDE 150 MILLILITER(S): 9 INJECTION, SOLUTION INTRAVENOUS at 14:13

## 2020-09-15 NOTE — BRIEF OPERATIVE NOTE - NSICDXBRIEFPROCEDURE_GEN_ALL_CORE_FT
PROCEDURES:  Ureterorenoscopy, with calculus removal 15-Sep-2020 13:53:55 left only gravel seen, looked like uric acid Cami Vann

## 2020-09-15 NOTE — ASU DISCHARGE PLAN (ADULT/PEDIATRIC) - CARE PROVIDER_API CALL
Cami Vann  Urology  [address] [place] [address], [address]  [place] [address]  Phone: [phone]  Fax: [phone]  Follow Up Time:

## 2020-09-15 NOTE — ASU DISCHARGE PLAN (ADULT/PEDIATRIC) - CALL YOUR DOCTOR IF YOU HAVE ANY OF THE FOLLOWING:
Increased irritability or sluggishness/Bleeding that does not stop/Nausea and vomiting that does not stop/Unable to urinate/Fever greater than (need to indicate Fahrenheit or Celsius)/Excessive diarrhea/Pain not relieved by Medications/Inability to tolerate liquids or foods/Swelling that gets worse

## 2020-09-15 NOTE — BRIEF OPERATIVE NOTE - OPERATION/FINDINGS
no solid stone seen, some clumps with small cloty after stent was removed which broke up when I tried to engage with a 1.7 N-Edvin. retrograde pyelogram guided me all calyces entered well visualized no stone seen

## 2020-09-15 NOTE — ASU PREOP CHECKLIST - TO WHOM
B Em FRANCOIS opportunity for questions and answers rendered Jeanine WINSLOW RN opportunity for questions and answers rendered

## 2020-10-15 ENCOUNTER — APPOINTMENT (OUTPATIENT)
Dept: UROLOGY | Facility: CLINIC | Age: 40
End: 2020-10-15
Payer: COMMERCIAL

## 2020-10-15 VITALS
DIASTOLIC BLOOD PRESSURE: 83 MMHG | WEIGHT: 222.6 LBS | TEMPERATURE: 98.3 F | BODY MASS INDEX: 30.15 KG/M2 | HEART RATE: 86 BPM | SYSTOLIC BLOOD PRESSURE: 151 MMHG | HEIGHT: 72 IN

## 2020-10-15 DIAGNOSIS — Z00.00 ENCOUNTER FOR GENERAL ADULT MEDICAL EXAMINATION W/OUT ABNORMAL FINDINGS: ICD-10-CM

## 2020-10-15 DIAGNOSIS — N20.0 CALCULUS OF KIDNEY: ICD-10-CM

## 2020-10-15 PROCEDURE — 99214 OFFICE O/P EST MOD 30 MIN: CPT | Mod: 24

## 2020-10-15 RX ORDER — SULFAMETHOXAZOLE AND TRIMETHOPRIM 800; 160 MG/1; MG/1
800-160 TABLET ORAL TWICE DAILY
Qty: 14 | Refills: 0 | Status: COMPLETED | COMMUNITY
Start: 2020-08-24 | End: 2020-10-15

## 2020-10-15 RX ORDER — TRAMADOL HYDROCHLORIDE 50 MG/1
50 TABLET, COATED ORAL
Qty: 12 | Refills: 0 | Status: COMPLETED | COMMUNITY
End: 2020-10-15

## 2020-10-15 NOTE — LETTER HEADER
[FreeTextEntry3] : Cami Vann M.D.\par Director of Urology\par [facility]\par [address] [place] [address], [address]\[place] [address]

## 2020-10-15 NOTE — HISTORY OF PRESENT ILLNESS
[FreeTextEntry1] : 07/07/2020 PCNL with attempts at chemical dissolution of remaining stone\par 09/15/2020 ureteroscopy did not see anything but gravel. \par He was discharged home to follow-up after an ultrasound so I could see if I missed it and he tells me some reason that was not scheduled. However since discharge he had voided numerous times with gravel and 2 "stones" , one 3 and one 5 mm but though he saved it it was Later thrown out by mistake\par \par feels ok no pain but occasional pressure in the groin similar to stent, ? clot colic

## 2020-10-15 NOTE — ASSESSMENT
[FreeTextEntry1] : 24 hour urine\par    good volume\par    very high ca\par    Slightly elevated sodium\par    Slightly low citrate\par    high normal uric acid\par re His groin pressure \par    PE benign\par    needs sono Anyway to see if that stone is indeed gone and I will tell us that there is any hydronephrosis or not.\par

## 2020-10-27 NOTE — PHYSICAL EXAM
RE- Eval in POC   [General Appearance - Well Nourished] : well nourished [General Appearance - Well Developed] : well developed [Normal Appearance] : normal appearance [Well Groomed] : well groomed [General Appearance - In No Acute Distress] : no acute distress [Costovertebral Angle Tenderness] : no ~M costovertebral angle tenderness [Abdomen Soft] : soft [Abdomen Tenderness] : non-tender [Penis Abnormality] : normal uncircumcised penis [Urethral Meatus] : meatus normal [Epididymis] : the epididymides were normal [Testes Tenderness] : no tenderness of the testes [Edema] : no peripheral edema [Auscultation Breath Sounds / Voice Sounds] : lungs were clear to auscultation bilaterally [] : no respiratory distress [Exaggerated Use Of Accessory Muscles For Inspiration] : no accessory muscle use [Respiration, Rhythm And Depth] : normal respiratory rhythm and effort [Affect] : the affect was normal [Oriented To Time, Place, And Person] : oriented to person, place, and time [Mood] : the mood was normal [Not Anxious] : not anxious [Normal Station and Gait] : the gait and station were normal for the patient's age

## 2020-12-30 ENCOUNTER — OUTPATIENT (OUTPATIENT)
Dept: OUTPATIENT SERVICES | Facility: HOSPITAL | Age: 40
LOS: 1 days | Discharge: HOME | End: 2020-12-30
Payer: COMMERCIAL

## 2020-12-30 DIAGNOSIS — N20.0 CALCULUS OF KIDNEY: ICD-10-CM

## 2020-12-30 DIAGNOSIS — Z98.890 OTHER SPECIFIED POSTPROCEDURAL STATES: Chronic | ICD-10-CM

## 2020-12-30 DIAGNOSIS — Z96.0 PRESENCE OF UROGENITAL IMPLANTS: Chronic | ICD-10-CM

## 2020-12-30 PROCEDURE — 76775 US EXAM ABDO BACK WALL LIM: CPT | Mod: 26

## 2021-05-10 NOTE — ASU PATIENT PROFILE, ADULT - REASON FOR ADMISSION, PROFILE
"   Procedure Note      Patient Name: Cr Millan   Patient ID: [id number]   Sex: Male   YOB: 1968    Primary Care Provider: Eugenio Jo MD   Referring Provider: Eugenio Jo MD    Visit Date: October 14, 2020    Provider: Humberto Dunne MD   Location: [facility]   Location Address: [address] [place] [address], [address]   [place]  [id number]   Location Phone: [phone]          FINAL REPORT   TRANSTHORACIC ECHOCARDIOGRAM REPORT    Diagnosis: Chest pain, Dyspnea   Height: 5'8\" Weight: 284 B/P: 134/70 BSA: 2.4   Tech: BNS   MEASUREMENTS:  RVID (Diastole) : RVID. (NORMAL: 0.7 to 2.4 cm max)   LVID (Systole): 3.5 cm (Diastole): 5.3 cm (NORMAL: 3.7 - 5.4 cm)   Posterior Wall Thickness (Diastole): 0.9 cm (NORMAL: 0.8 - 1.1 cm)   Septal Thickness (Diastole): 1.0 cm (NORMAL: 0.7 - 1.2 cm)   LAID (Systole): 3.9 cm (NORMAL: 1.9 - 3.8 cm)   Aortic Root Diameter (Diastole): 3.0 cm (NORMAL: 2.0 - 3.7 cm)   COMMENTS:  The patient underwent 2-D, M-Mode, and Doppler examination, including pulse-wave, continuous-wave, and color-flow analysis; the study is technically difficult due to body habitus.   FINDINGS:  MITRAL VALVE: The mitral valve was mildly thickened. It opens well. There is trace mitral regurgitation.   AORTIC VALVE: Mildly sclerotic. There is no aortic stenosis. There is trivial-to-mild aortic regurgitation.   TRICUSPID VALVE: Normal in structure. No significant tricuspid regurgitation.   PULMONIC VALVE: Grossly normal.   AORTIC ROOT: Normal in size.   LEFT ATRIUM: Borderline enlarged. LA volume index is normal.   LEFT VENTRICLE: Normal in size. Normal wall thickness. Ejection fraction 55-60%. Borderline criteria for diastolic dysfunction. Wall motion is normal.   RIGHT VENTRICLE: Normal in size and function.   RIGHT ATRIUM: Normal in size.   PERICARDIUM: No evidence of pericardial effusion.   INFERIOR VENA CAVA: Normal.   DOPPLER: E/A ratio is 1.1. DT= 151 msec. IVRT is 63 msec. E/E' is " 12.   Faxed: 10/14/2020      CONCLUSION:    1.  Normal biventricular systolic function, ejection fraction 55-60%.  2.  Borderline criteria for diastolic dysfunction.  3.  Trivial-to-mild aortic regurgitation.        JOSH Dunne MD  CBD:vm                 Electronically Signed by: Sarah Yoon-, Other -Author on October 14, 2020 01:13:23 PM  Electronically Co-signed by: Humberto Dunne MD -Reviewer on October 15, 2020 09:28:27 AM   cystoscopy

## 2023-04-20 NOTE — DISCHARGE NOTE PROVIDER - REASON FOR ADMISSION
Multilayer Compression Wrap   (Not Unna) Below the Knee    NAME:  Destiney Brown  YOB: 1941  MEDICAL RECORD NUMBER:  [id number]  DATE:  4/20/2023    Multilayer compression wrap: Removed old Multilayer wrap if indicated and wash leg with mild soap/water. Applied moisturizing agent to dry skin as needed. Applied primary and secondary dressing as ordered. Applied multilayered dressing below the knee to right lower leg. Instructed patient/caregiver not to remove dressing and to keep it clean and dry. Instructed patient/caregiver on complications to report to provider, such as pain, numbness in toes, heavy drainage, and slippage of dressing. Instructed patient on purpose of compression dressing and on activity and exercise recommendations.       Electronically signed by Sumi Robert RN on 4/20/2023 at 11:43 AM left PCNL

## 2024-05-26 NOTE — H&P PST ADULT - NSICDXPASTSURGICALHX_GEN_ALL_CORE_FT
Report given to Telma KIRBY.   Pt Transported to room T711 with all the patient's belongings, on 4L NC, tele monitor via bed with this RN and a tech.   PAST SURGICAL HISTORY:  H/O knee surgery     S/P ureteral stent placement per patient stent placement to left kidney